# Patient Record
Sex: FEMALE | Race: OTHER | NOT HISPANIC OR LATINO | URBAN - METROPOLITAN AREA
[De-identification: names, ages, dates, MRNs, and addresses within clinical notes are randomized per-mention and may not be internally consistent; named-entity substitution may affect disease eponyms.]

---

## 2021-05-23 ENCOUNTER — INPATIENT (INPATIENT)
Facility: HOSPITAL | Age: 26
LOS: 0 days | Discharge: PSYCHIATRIC OR SUBSTANCE USE FACILITY | End: 2021-05-24
Attending: INTERNAL MEDICINE | Admitting: INTERNAL MEDICINE
Payer: MEDICAID

## 2021-05-23 VITALS
DIASTOLIC BLOOD PRESSURE: 80 MMHG | OXYGEN SATURATION: 100 % | HEART RATE: 96 BPM | HEIGHT: 63 IN | WEIGHT: 110.01 LBS | SYSTOLIC BLOOD PRESSURE: 107 MMHG | TEMPERATURE: 98 F | RESPIRATION RATE: 20 BRPM

## 2021-05-23 DIAGNOSIS — T50.902A POISONING BY UNSPECIFIED DRUGS, MEDICAMENTS AND BIOLOGICAL SUBSTANCES, INTENTIONAL SELF-HARM, INITIAL ENCOUNTER: ICD-10-CM

## 2021-05-23 DIAGNOSIS — T39.1X2A POISONING BY 4-AMINOPHENOL DERIVATIVES, INTENTIONAL SELF-HARM, INITIAL ENCOUNTER: ICD-10-CM

## 2021-05-23 LAB
ALBUMIN SERPL ELPH-MCNC: 3.8 G/DL — SIGNIFICANT CHANGE UP (ref 3.3–5)
ALBUMIN SERPL ELPH-MCNC: 3.8 G/DL — SIGNIFICANT CHANGE UP (ref 3.3–5)
ALBUMIN SERPL ELPH-MCNC: 4 G/DL — SIGNIFICANT CHANGE UP (ref 3.3–5)
ALP SERPL-CCNC: 59 U/L — SIGNIFICANT CHANGE UP (ref 40–120)
ALP SERPL-CCNC: 59 U/L — SIGNIFICANT CHANGE UP (ref 40–120)
ALP SERPL-CCNC: 65 U/L — SIGNIFICANT CHANGE UP (ref 40–120)
ALT FLD-CCNC: 14 U/L — SIGNIFICANT CHANGE UP (ref 12–78)
ALT FLD-CCNC: 21 U/L — SIGNIFICANT CHANGE UP (ref 12–78)
ALT FLD-CCNC: 25 U/L — SIGNIFICANT CHANGE UP (ref 12–78)
AMPHET UR-MCNC: NEGATIVE — SIGNIFICANT CHANGE UP
ANION GAP SERPL CALC-SCNC: 11 MMOL/L — SIGNIFICANT CHANGE UP (ref 5–17)
APAP SERPL-MCNC: 131 UG/ML — HIGH (ref 10–30)
APAP SERPL-MCNC: 19 UG/ML — SIGNIFICANT CHANGE UP (ref 10–30)
APAP SERPL-MCNC: 242 UG/ML — CRITICAL HIGH (ref 10–30)
APPEARANCE UR: CLEAR — SIGNIFICANT CHANGE UP
APTT BLD: 21.6 SEC — LOW (ref 27.5–35.5)
AST SERPL-CCNC: 16 U/L — SIGNIFICANT CHANGE UP (ref 15–37)
AST SERPL-CCNC: 17 U/L — SIGNIFICANT CHANGE UP (ref 15–37)
AST SERPL-CCNC: 17 U/L — SIGNIFICANT CHANGE UP (ref 15–37)
BACTERIA # UR AUTO: ABNORMAL
BARBITURATES UR SCN-MCNC: NEGATIVE — SIGNIFICANT CHANGE UP
BASE EXCESS BLDA CALC-SCNC: 1 MMOL/L — SIGNIFICANT CHANGE UP (ref -2–2)
BASOPHILS # BLD AUTO: 0.08 K/UL — SIGNIFICANT CHANGE UP (ref 0–0.2)
BASOPHILS NFR BLD AUTO: 0.8 % — SIGNIFICANT CHANGE UP (ref 0–2)
BENZODIAZ UR-MCNC: NEGATIVE — SIGNIFICANT CHANGE UP
BILIRUB DIRECT SERPL-MCNC: 0.13 MG/DL — SIGNIFICANT CHANGE UP (ref 0.05–0.2)
BILIRUB DIRECT SERPL-MCNC: 0.13 MG/DL — SIGNIFICANT CHANGE UP (ref 0.05–0.2)
BILIRUB INDIRECT FLD-MCNC: 0.4 MG/DL — SIGNIFICANT CHANGE UP (ref 0.2–1)
BILIRUB INDIRECT FLD-MCNC: 0.5 MG/DL — SIGNIFICANT CHANGE UP (ref 0.2–1)
BILIRUB SERPL-MCNC: 0.5 MG/DL — SIGNIFICANT CHANGE UP (ref 0.2–1.2)
BILIRUB SERPL-MCNC: 0.5 MG/DL — SIGNIFICANT CHANGE UP (ref 0.2–1.2)
BILIRUB SERPL-MCNC: 0.6 MG/DL — SIGNIFICANT CHANGE UP (ref 0.2–1.2)
BILIRUB UR-MCNC: NEGATIVE — SIGNIFICANT CHANGE UP
BLOOD GAS COMMENTS: SIGNIFICANT CHANGE UP
BLOOD GAS COMMENTS: SIGNIFICANT CHANGE UP
BLOOD GAS SOURCE: SIGNIFICANT CHANGE UP
BUN SERPL-MCNC: 9 MG/DL — SIGNIFICANT CHANGE UP (ref 7–23)
CALCIUM SERPL-MCNC: 8.4 MG/DL — LOW (ref 8.5–10.1)
CHLORIDE SERPL-SCNC: 104 MMOL/L — SIGNIFICANT CHANGE UP (ref 96–108)
CO2 SERPL-SCNC: 23 MMOL/L — SIGNIFICANT CHANGE UP (ref 22–31)
COCAINE METAB.OTHER UR-MCNC: NEGATIVE — SIGNIFICANT CHANGE UP
COLOR SPEC: YELLOW — SIGNIFICANT CHANGE UP
COVID-19 SPIKE DOMAIN AB INTERP: POSITIVE
COVID-19 SPIKE DOMAIN ANTIBODY RESULT: >250 U/ML — HIGH
CREAT SERPL-MCNC: 0.64 MG/DL — SIGNIFICANT CHANGE UP (ref 0.5–1.3)
DIFF PNL FLD: NEGATIVE — SIGNIFICANT CHANGE UP
EOSINOPHIL # BLD AUTO: 0.14 K/UL — SIGNIFICANT CHANGE UP (ref 0–0.5)
EOSINOPHIL NFR BLD AUTO: 1.3 % — SIGNIFICANT CHANGE UP (ref 0–6)
EPI CELLS # UR: SIGNIFICANT CHANGE UP
ETHANOL SERPL-MCNC: <10 MG/DL — SIGNIFICANT CHANGE UP (ref 0–10)
GLUCOSE SERPL-MCNC: 110 MG/DL — HIGH (ref 70–99)
GLUCOSE UR QL: NEGATIVE MG/DL — SIGNIFICANT CHANGE UP
HCG SERPL-ACNC: <1 MIU/ML — SIGNIFICANT CHANGE UP
HCO3 BLDA-SCNC: 22 MMOL/L — SIGNIFICANT CHANGE UP (ref 21–29)
HCT VFR BLD CALC: 37 % — SIGNIFICANT CHANGE UP (ref 34.5–45)
HGB BLD-MCNC: 12.3 G/DL — SIGNIFICANT CHANGE UP (ref 11.5–15.5)
HOROWITZ INDEX BLDA+IHG-RTO: 21 — SIGNIFICANT CHANGE UP
IMM GRANULOCYTES NFR BLD AUTO: 0.2 % — SIGNIFICANT CHANGE UP (ref 0–1.5)
INR BLD: 1.1 RATIO — SIGNIFICANT CHANGE UP (ref 0.88–1.16)
KETONES UR-MCNC: NEGATIVE — SIGNIFICANT CHANGE UP
LACTATE SERPL-SCNC: 1.6 MMOL/L — SIGNIFICANT CHANGE UP (ref 0.7–2)
LEUKOCYTE ESTERASE UR-ACNC: ABNORMAL
LIDOCAIN IGE QN: 86 U/L — SIGNIFICANT CHANGE UP (ref 73–393)
LYMPHOCYTES # BLD AUTO: 4.39 K/UL — HIGH (ref 1–3.3)
LYMPHOCYTES # BLD AUTO: 41.3 % — SIGNIFICANT CHANGE UP (ref 13–44)
MCHC RBC-ENTMCNC: 25.4 PG — LOW (ref 27–34)
MCHC RBC-ENTMCNC: 33.2 GM/DL — SIGNIFICANT CHANGE UP (ref 32–36)
MCV RBC AUTO: 76.3 FL — LOW (ref 80–100)
METHADONE UR-MCNC: NEGATIVE — SIGNIFICANT CHANGE UP
MONOCYTES # BLD AUTO: 0.89 K/UL — SIGNIFICANT CHANGE UP (ref 0–0.9)
MONOCYTES NFR BLD AUTO: 8.4 % — SIGNIFICANT CHANGE UP (ref 2–14)
NEUTROPHILS # BLD AUTO: 5.12 K/UL — SIGNIFICANT CHANGE UP (ref 1.8–7.4)
NEUTROPHILS NFR BLD AUTO: 48 % — SIGNIFICANT CHANGE UP (ref 43–77)
NITRITE UR-MCNC: POSITIVE
NRBC # BLD: 0 /100 WBCS — SIGNIFICANT CHANGE UP (ref 0–0)
OPIATES UR-MCNC: NEGATIVE — SIGNIFICANT CHANGE UP
PCO2 BLDA: 28 MMHG — LOW (ref 32–46)
PCP SPEC-MCNC: SIGNIFICANT CHANGE UP
PCP UR-MCNC: NEGATIVE — SIGNIFICANT CHANGE UP
PH BLD: 7.51 — HIGH (ref 7.35–7.45)
PH UR: 7 — SIGNIFICANT CHANGE UP (ref 5–8)
PLATELET # BLD AUTO: 297 K/UL — SIGNIFICANT CHANGE UP (ref 150–400)
PO2 BLDA: 130 MMHG — HIGH (ref 74–108)
POTASSIUM SERPL-MCNC: 3 MMOL/L — LOW (ref 3.5–5.3)
POTASSIUM SERPL-SCNC: 3 MMOL/L — LOW (ref 3.5–5.3)
PROT SERPL-MCNC: 7.2 GM/DL — SIGNIFICANT CHANGE UP (ref 6–8.3)
PROT SERPL-MCNC: 7.3 GM/DL — SIGNIFICANT CHANGE UP (ref 6–8.3)
PROT SERPL-MCNC: 7.3 GM/DL — SIGNIFICANT CHANGE UP (ref 6–8.3)
PROT UR-MCNC: NEGATIVE MG/DL — SIGNIFICANT CHANGE UP
PROTHROM AB SERPL-ACNC: 12.7 SEC — SIGNIFICANT CHANGE UP (ref 10.6–13.6)
RAPID RVP RESULT: SIGNIFICANT CHANGE UP
RBC # BLD: 4.85 M/UL — SIGNIFICANT CHANGE UP (ref 3.8–5.2)
RBC # FLD: 13.8 % — SIGNIFICANT CHANGE UP (ref 10.3–14.5)
RBC CASTS # UR COMP ASSIST: SIGNIFICANT CHANGE UP /HPF (ref 0–4)
SALICYLATES SERPL-MCNC: <1.7 MG/DL — LOW (ref 2.8–20)
SAO2 % BLDA: 99 % — HIGH (ref 92–96)
SARS-COV-2 IGG+IGM SERPL QL IA: >250 U/ML — HIGH
SARS-COV-2 IGG+IGM SERPL QL IA: POSITIVE
SARS-COV-2 RNA SPEC QL NAA+PROBE: SIGNIFICANT CHANGE UP
SODIUM SERPL-SCNC: 138 MMOL/L — SIGNIFICANT CHANGE UP (ref 135–145)
SP GR SPEC: 1 — LOW (ref 1.01–1.02)
THC UR QL: NEGATIVE — SIGNIFICANT CHANGE UP
TSH SERPL-MCNC: 1.32 UIU/ML — SIGNIFICANT CHANGE UP (ref 0.36–3.74)
UROBILINOGEN FLD QL: NEGATIVE MG/DL — SIGNIFICANT CHANGE UP
WBC # BLD: 10.64 K/UL — HIGH (ref 3.8–10.5)
WBC # FLD AUTO: 10.64 K/UL — HIGH (ref 3.8–10.5)
WBC UR QL: SIGNIFICANT CHANGE UP

## 2021-05-23 PROCEDURE — 99222 1ST HOSP IP/OBS MODERATE 55: CPT

## 2021-05-23 PROCEDURE — 99053 MED SERV 10PM-8AM 24 HR FAC: CPT

## 2021-05-23 PROCEDURE — 93010 ELECTROCARDIOGRAM REPORT: CPT

## 2021-05-23 PROCEDURE — 12345: CPT | Mod: NC

## 2021-05-23 PROCEDURE — 99291 CRITICAL CARE FIRST HOUR: CPT

## 2021-05-23 RX ORDER — ACETYLCYSTEINE 200 MG/ML
7 VIAL (ML) MISCELLANEOUS ONCE
Refills: 0 | Status: COMPLETED | OUTPATIENT
Start: 2021-05-23 | End: 2021-05-23

## 2021-05-23 RX ORDER — ACETYLCYSTEINE 200 MG/ML
2.5 VIAL (ML) MISCELLANEOUS ONCE
Refills: 0 | Status: COMPLETED | OUTPATIENT
Start: 2021-05-23 | End: 2021-05-23

## 2021-05-23 RX ORDER — POTASSIUM CHLORIDE 20 MEQ
40 PACKET (EA) ORAL ONCE
Refills: 0 | Status: COMPLETED | OUTPATIENT
Start: 2021-05-23 | End: 2021-05-23

## 2021-05-23 RX ORDER — ACETYLCYSTEINE 200 MG/ML
5 VIAL (ML) MISCELLANEOUS ONCE
Refills: 0 | Status: COMPLETED | OUTPATIENT
Start: 2021-05-23 | End: 2021-05-23

## 2021-05-23 RX ORDER — SODIUM CHLORIDE 9 MG/ML
1000 INJECTION INTRAMUSCULAR; INTRAVENOUS; SUBCUTANEOUS ONCE
Refills: 0 | Status: COMPLETED | OUTPATIENT
Start: 2021-05-23 | End: 2021-05-23

## 2021-05-23 RX ORDER — ACTIVATED CHARCOAL 208 MG/ML
50 SUSPENSION ORAL ONCE
Refills: 0 | Status: COMPLETED | OUTPATIENT
Start: 2021-05-23 | End: 2021-05-23

## 2021-05-23 RX ADMIN — Medication 64.06 GRAM(S): at 11:38

## 2021-05-23 RX ADMIN — Medication 128.13 GRAM(S): at 07:03

## 2021-05-23 RX ADMIN — SODIUM CHLORIDE 1000 MILLILITER(S): 9 INJECTION INTRAMUSCULAR; INTRAVENOUS; SUBCUTANEOUS at 06:36

## 2021-05-23 RX ADMIN — Medication 285 GRAM(S): at 05:44

## 2021-05-23 RX ADMIN — ACTIVATED CHARCOAL 50 GRAM(S): 208 SUSPENSION ORAL at 05:56

## 2021-05-23 RX ADMIN — Medication 40 MILLIEQUIVALENT(S): at 06:05

## 2021-05-23 NOTE — ED PROVIDER NOTE - PROGRESS NOTE DETAILS
[facility] poison control consulted for Tylenol overdose, Pabu:  agree with NAC asap, admission, recommend charcoal 50 g oral as well, given normal mental status, repeat tylenol level recommended at 4 hour corinne

## 2021-05-23 NOTE — ED PROVIDER NOTE - CRITICAL CARE ATTENDING CONTRIBUTION TO CARE
Pt. was critically ill and suffered potentially life threatening medical problems.  I personally consulted other physicians, spoke with patient's family regarding patient's condition, performed emergent diagnostic procedures and lab work, performed procedures at bedside.

## 2021-05-23 NOTE — PROGRESS NOTE ADULT - PROBLEM SELECTOR PLAN 1
Admitted to [facility]  [facility] -- current back is 5 grams over 16 hours, started at 5 AM on 5/23  Initial LFTs normal; initial Tylenol level 242  F/U at 11:30 5/23 shows LFTs normal; Tylenol level 131  Per d/w poison control:    Recheck LFTs and Tylenol at 6 PM; if LFTs normal and Tylenol = 0, then complete the current bag only.    If LFTs abnormal or Tylenol > 0, then recheck at 10 PM    If still LFTs abnormal or Tylenol > 0, then:        hang another 5 gm over 16 hours when current bag done.         recheck LFTs and Tylenol at 3 AM and tomorrow at 8 AM    Repeat until Tylenol = 0 and LFTs normal (or high but trending down) Admitted to [facility]  [facility] -- current back is 5 grams over 16 hours, started at 5 AM on 5/23  Initial LFTs normal; initial Tylenol level 242  F/U at 11:30 5/23 shows LFTs normal; Tylenol level 131  Per d/w Kendy at poison control:    Recheck LFTs and Tylenol at 6 PM; if LFTs normal and Tylenol = 0, then complete the current bag only.    If LFTs abnormal or Tylenol > 0, then recheck at 10 PM    If still LFTs abnormal or Tylenol > 0, then:        hang another 5 gm over 16 hours when current bag done.         recheck LFTs and Tylenol at 3 AM and tomorrow at 8 AM    Repeat until Tylenol = 0 and LFTs normal (or high but trending down)  Will report all findings to Poison Control at [phone] Admitted to [facility]  c.[facility] -- current bag is 5 grams over 16 hours, started at 11 AM on 5/23  Initial ingestion approx. 3:20 AM on 5/23  Initial LFTs normal; initial Tylenol level 242  F/U at 11:30 5/23 shows LFTs normal; Tylenol level 131  F/U at 18:00 5/23 shows LFTs normal; Tylenol level 19  Per d/w Kendy at poison control:    Recheck LFTs and Tylenol at 10 PM, 3 AM, 8 AM    Continue current NAC bag (ends at 3 AM) regardless    At 3 AM, If LFTs abnormal or Tylenol > 0, then:        hang another 5 gm over 16 hours        recheck LFTs and Tylenol ~ every 4-5 hours;     Repeat until Tylenol = 0 and LFTs normal (or high but trending down)  Will report all findings to Poison Control at [phone]

## 2021-05-23 NOTE — H&P ADULT - NSHPPHYSICALEXAM_GEN_ALL_CORE
Physical exam:   General: patient in not toxic   Head:  Atraumatic, Normocephalic  Eyes: EOMI, PERRLA, clear sclera  Neck: Supple, thyroid nontender, non enlarged  Cardio: S1/S2 +ve, regular rate and rhythm, no M/G/R  Resp: clear to ausculation bilaterally, no rales or wheezes  GI: abdomen soft, nontender, non distended, no guarding, BS +ve x 4  Ext: no significant pedal edema  Neuro: move all limbs

## 2021-05-23 NOTE — ED ADULT NURSE NOTE - ED STAT RN HANDOFF DETAILS
Report given to Melissa PETERS. Patient is A&Ox4. Patient denies pain and discomfort. Patient is in no acute distress. Patient has acetylcysteine running at 250mL/hr in thr right AC 20g. Patient has IV access in the left AC 20g as well. Patient received all medications as ordered and no adverse reactions were noted. Patient's vitals are within normal limits, all concerns endorsed to the oncoming RN.

## 2021-05-23 NOTE — H&P ADULT - ASSESSMENT
24 yo female with PMH of depression admitted for tylenol overdose and suicidal attempts    tylenol level 242. LFT wnl.  [facility] poison control consulted for Tylenol overdose, Pabu:  agree with NAC asap, admission, recommend charcoal 50 g oral as well, given normal mental status, repeat tylenol level recommended at 4 hour corinne at 7 am

## 2021-05-23 NOTE — BH CONSULTATION LIAISON ASSESSMENT NOTE - SUMMARY
26 yo F with depression never in treatment, no prior SA, bibems s/p suicide attempt.  Pt. took estimated 30 tabs of 500 mg tylenol extra strength tabs about 30 minutes before ER presentation. Patient is status post suicide attempt feeling hopeless and worthless, will likely need psych admission once medically cleared, cannot leave AMA

## 2021-05-23 NOTE — BH CONSULTATION LIAISON ASSESSMENT NOTE - NSBHCHARTREVIEWVS_PSY_A_CORE FT
Vital Signs Last 24 Hrs  T(C): 36.4 (23 May 2021 12:17), Max: 36.5 (23 May 2021 03:51)  T(F): 97.5 (23 May 2021 12:17), Max: 97.7 (23 May 2021 03:51)  HR: 87 (23 May 2021 12:17) (82 - 107)  BP: 97/99 (23 May 2021 12:17) (92/72 - 115/65)  BP(mean): 76 (23 May 2021 06:12) (76 - 76)  RR: 16 (23 May 2021 12:17) (16 - 21)  SpO2: 99% (23 May 2021 12:17) (97% - 100%)

## 2021-05-23 NOTE — ED PROVIDER NOTE - PHYSICAL EXAMINATION
Vitals: WNL  Gen: AAOx3, NAD, sitting comfortably in stretcher, non-toxic   Head: ncat, perrla, eomi b/l  Neck: supple, no lymphadenopathy, no midline deviation  Heart: rrr, no m/r/g  Lungs: CTA b/l, no rales/ronchi/wheezes  Abd: soft, nontender, non-distended, no rebound or guarding  Ext: no clubbing/cyanosis/edema  Neuro: sensation and muscle strength intact b/l, steady gait

## 2021-05-23 NOTE — H&P ADULT - HISTORY OF PRESENT ILLNESS
history was obtained from ED physician and documentation since pt was crying in tears during my interview only shake/node head.     " 24 yo F with depression, bibems s/p suicide attempt.  Pt. took estimated 30 tabs of 500 mg tylenol extra strength tabs about 30 minutes before ER presentation.  Pt. denies acute inciting event, but notes depression for months.  She denies being formally evaluated.  She decided to end her life tonight because it's too much.  Pt. doesn't elaborate further, but stays it's ok to call her , Xavi: [phone].  Pt. admits that she vomited a few times and saw white pills in vomitus. "

## 2021-05-23 NOTE — H&P ADULT - NSHPLABSRESULTS_GEN_ALL_CORE
Vital Signs Last 24 Hrs  T(C): 36.3 (23 May 2021 06:12), Max: 36.5 (23 May 2021 03:51)  T(F): 97.3 (23 May 2021 06:12), Max: 97.7 (23 May 2021 03:51)  HR: 107 (23 May 2021 06:12) (96 - 107)  BP: 115/65 (23 May 2021 06:12) (107/80 - 115/65)  BP(mean): 76 (23 May 2021 06:12) (76 - 76)  RR: 20 (23 May 2021 06:12) (20 - 20)  SpO2: 100% (23 May 2021 06:12) (100% - 100%)    CBC Full  -  ( 23 May 2021 04:28 )  WBC Count : 10.64 K/uL  RBC Count : 4.85 M/uL  Hemoglobin : 12.3 g/dL  Hematocrit : 37.0 %  Platelet Count - Automated : 297 K/uL  Mean Cell Volume : 76.3 fl  Mean Cell Hemoglobin : 25.4 pg  Mean Cell Hemoglobin Concentration : 33.2 gm/dL  Auto Neutrophil # : 5.12 K/uL  Auto Lymphocyte # : 4.39 K/uL  Auto Monocyte # : 0.89 K/uL  Auto Eosinophil # : 0.14 K/uL  Auto Basophil # : 0.08 K/uL  Auto Neutrophil % : 48.0 %  Auto Lymphocyte % : 41.3 %  Auto Monocyte % : 8.4 %  Auto Eosinophil % : 1.3 %  Auto Basophil % : 0.8 %    05-23    138  |  104  |  9   ----------------------------<  110<H>  3.0<L>   |  23  |  0.64    Ca    8.4<L>      23 May 2021 04:28    TPro  7.2  /  Alb  4.0  /  TBili  0.5  /  DBili  x   /  AST  17  /  ALT  14  /  AlkPhos  65  05-23    SARS-CoV-2: NotDetec (23 May 2021 04:28)    Home Medications:    LIVER FUNCTIONS - ( 23 May 2021 04:28 )  Alb: 4.0 g/dL / Pro: 7.2 gm/dL / ALK PHOS: 65 U/L / ALT: 14 U/L / AST: 17 U/L / GGT: x           PT/INR - ( 23 May 2021 04:28 )   PT: 12.7 sec;   INR: 1.10 ratio         PTT - ( 23 May 2021 04:28 )  PTT:21.6 sec with patient

## 2021-05-23 NOTE — ED PROVIDER NOTE - OBJECTIVE STATEMENT
24 yo F with depression, bibems s/p suicide attempt.  Pt. took estimated 30 tabs of 500 mg tylenol extra strength tabs about 30 minutes before ER presentation.  Pt. denies acute inciting event, but notes depression for months.  She denies being formally evaluated.  She decided to end her life tonight because it's too much.  Pt. doesn't elaborate further, but stays it's ok to call her , Xavi: [phone].  Pt. admits that she vomited a few times and saw white pills in vomitus.    ROS: negative for fever, cough, headache, chest pain, shortness of breath, abd pain, nausea, vomiting, diarrhea, rash, paresthesia, and weakness--all other systems reviewed are negative.   PMH: Denies; Meds: Denies; SH: Denies smoking/drinking/drug use

## 2021-05-23 NOTE — ED BEHAVIORAL HEALTH NOTE - BEHAVIORAL HEALTH NOTE
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 Pt takes care of ADLs independently.   DATE HPI STARTED: About 1 week   DECOMPENSATION: Pt and  were  in [facility], and pt and  [place] to [place] last year. ’s parents live in the [place], and do not accept pt as ’s Wife.  is allowed into their home, but pt is not allowed into their home.  and pt talked about possibility of , and pt has become increasingly anxious and upset about this. This week pt has made suicidal statements such as “If I don’t get you, I will not be living.” Pt was informed yesterday by a mutual friend that  made a final decision that pt and  will separate. Pt sent text to  stating “no-one is responsible for my death or demise.”  rushed home, and pt opened the door and stated that she took half a bottle of medicine.  denies psychotic symptoms, aggression/violence, substance use, changes to ADLs or access to guns/weapons.        SUICIDALITY: Over the past few days pt has made statements such as “If I don’t get you I will not be living.”   VIOLENCE: None  SUBSTANCE: None      ========================  PAST PSYCHIATRIC HISTORY  ========================  DATE PAST PSYCHIATRIC HISTORY STARTED: no past psychiatric hx  MAIN PSYCHIATRIC DIAGNOSIS: no past psychiatric hx   PSYCHIATRIC HOSPITALIZATIONS: None  PRIOR ILLNESS: None   SUICIDALITY: No hx of self injury or SA  VIOLENCE: No violence towards others. Pt has thrown phone down when frustrated.    SUBSTANCE USE: None      ==============  OTHER HISTORY  ==============  CURRENT MEDICATION: None  MEDICAL HISTORY: About 2 months ago pt had COVID and a type of fungus or rash on her face, which seems to have resolved.   ALLERGIES: None   LEGAL ISSUES: None  FIREARM ACCESS: None  SOCIAL HISTORY: Pt lived in [facility] until immig[place] the [place] last [place]. Some family members including Sister and ’s parents live in the [place].    FAMILY HISTORY: None  DEVELOPMENTAL HISTORY: No abnormalities reported

## 2021-05-23 NOTE — ED PROVIDER NOTE - CLINICAL SUMMARY MEDICAL DECISION MAKING FREE TEXT BOX
26 yo F with suicide attempt, tylenol overdose  -stat labs, and repeat tylenol at 7:30 am (4 hr level), basic labs, coags, tylenol, etoh, drug screen, hcg, abg, rvp/covid, lactate, tsh, ua, cx, ekg, iv, hydration, monitor, 1:1 OBS  -f/u results, reeval

## 2021-05-23 NOTE — BH CONSULTATION LIAISON ASSESSMENT NOTE - NSBHCONSULTRECOMMENDOTHER_PSY_A_CORE FT
-c/w CO 1:1  -can give ativan 1mg q4H PRN anxiety  -for severe agitation, Provide Haldol 5mg q 6 hours PRN for agitation (IM or p.o.), Ativan 2mg q 6 hours PRN for agitation (IM or p.o.), Benadryl 50mg q 6 hours PRN for agitation (IM or p.o.)   -cannot leave AMA  -psychiatry will follow, likely need inpatient admission once medically cleared

## 2021-05-23 NOTE — ED ADULT NURSE NOTE - OBJECTIVE STATEMENT
Pt alert and verbally responsive present to ED with suicidal attempt, took 30 tabs of tylenols 500mg 30 ellie ago, complaining of abdominal pain and nasal congestion. Pt sated feeling depressed for a long time, also pt  called ambulance. Pt denies any trauma tonight or give any specific reason why she took the pills except for has been felling depressed, pt unable to answer any further question during assessment, pt became withdrawn, 1:1 monitoring initiated immediately.

## 2021-05-23 NOTE — ED ADULT NURSE NOTE - CHIEF COMPLAINT QUOTE
suicidal attempt, took 30 tabs of tylenol 500mg 30 mins ago, complaining of abdominal pain and nasal congestion

## 2021-05-23 NOTE — ED PROVIDER NOTE - CARE PLAN
Principal Discharge DX:	Tylenol overdose, intentional self-harm, initial encounter   Principal Discharge DX:	Tylenol overdose, intentional self-harm, initial encounter  Secondary Diagnosis:	Acute cystitis without hematuria

## 2021-05-23 NOTE — ED ADULT NURSE NOTE - NSIMPLEMENTINTERV_GEN_ALL_ED
Implemented All Fall Risk Interventions:  [place] to call system. Call bell, personal items and telephone within reach. Instruct patient to call for assistance. Room bathroom lighting operational. Non-slip footwear when patient is off stretcher. Physically safe environment: no spills, clutter or unnecessary equipment. Stretcher in lowest position, wheels locked, appropriate side rails in place. Provide visual cue, wrist band, yellow gown, etc. Monitor gait and stability. Monitor for mental status changes and reorient to person, place, and time. Review medications for side effects contributing to fall risk. Reinforce activity limits and safety measures with patient and family.

## 2021-05-24 ENCOUNTER — INPATIENT (INPATIENT)
Facility: HOSPITAL | Age: 26
LOS: 10 days | Discharge: HOME | End: 2021-06-04
Attending: PSYCHIATRY & NEUROLOGY | Admitting: PSYCHIATRY & NEUROLOGY
Payer: COMMERCIAL

## 2021-05-24 VITALS — WEIGHT: 111.99 LBS | HEIGHT: 61 IN

## 2021-05-24 VITALS
TEMPERATURE: 98 F | DIASTOLIC BLOOD PRESSURE: 73 MMHG | RESPIRATION RATE: 18 BRPM | OXYGEN SATURATION: 98 % | HEART RATE: 74 BPM | SYSTOLIC BLOOD PRESSURE: 106 MMHG

## 2021-05-24 DIAGNOSIS — F32.9 MAJOR DEPRESSIVE DISORDER, SINGLE EPISODE, UNSPECIFIED: ICD-10-CM

## 2021-05-24 LAB
ALBUMIN SERPL ELPH-MCNC: 3.6 G/DL — SIGNIFICANT CHANGE UP (ref 3.3–5)
ALBUMIN SERPL ELPH-MCNC: 3.8 G/DL — SIGNIFICANT CHANGE UP (ref 3.3–5)
ALP SERPL-CCNC: 53 U/L — SIGNIFICANT CHANGE UP (ref 40–120)
ALP SERPL-CCNC: 55 U/L — SIGNIFICANT CHANGE UP (ref 40–120)
ALP SERPL-CCNC: 58 U/L — SIGNIFICANT CHANGE UP (ref 40–120)
ALP SERPL-CCNC: 59 U/L — SIGNIFICANT CHANGE UP (ref 40–120)
ALT FLD-CCNC: 18 U/L — SIGNIFICANT CHANGE UP (ref 12–78)
ALT FLD-CCNC: 20 U/L — SIGNIFICANT CHANGE UP (ref 12–78)
ALT FLD-CCNC: 21 U/L — SIGNIFICANT CHANGE UP (ref 12–78)
ALT FLD-CCNC: 21 U/L — SIGNIFICANT CHANGE UP (ref 12–78)
ANION GAP SERPL CALC-SCNC: 8 MMOL/L — SIGNIFICANT CHANGE UP (ref 5–17)
APAP SERPL-MCNC: < 2 UG/ML (ref 10–30)
APAP SERPL-MCNC: <2 UG/ML — LOW (ref 10–30)
APAP SERPL-MCNC: <2 UG/ML — LOW (ref 10–30)
AST SERPL-CCNC: 11 U/L — LOW (ref 15–37)
AST SERPL-CCNC: 12 U/L — LOW (ref 15–37)
AST SERPL-CCNC: 14 U/L — LOW (ref 15–37)
AST SERPL-CCNC: 17 U/L — SIGNIFICANT CHANGE UP (ref 15–37)
BILIRUB DIRECT SERPL-MCNC: 0.15 MG/DL — SIGNIFICANT CHANGE UP (ref 0.05–0.2)
BILIRUB DIRECT SERPL-MCNC: 0.16 MG/DL — SIGNIFICANT CHANGE UP (ref 0.05–0.2)
BILIRUB DIRECT SERPL-MCNC: 0.16 MG/DL — SIGNIFICANT CHANGE UP (ref 0.05–0.2)
BILIRUB INDIRECT FLD-MCNC: 0.4 MG/DL — SIGNIFICANT CHANGE UP (ref 0.2–1)
BILIRUB INDIRECT FLD-MCNC: 0.4 MG/DL — SIGNIFICANT CHANGE UP (ref 0.2–1)
BILIRUB INDIRECT FLD-MCNC: 0.5 MG/DL — SIGNIFICANT CHANGE UP (ref 0.2–1)
BILIRUB SERPL-MCNC: 0.6 MG/DL — SIGNIFICANT CHANGE UP (ref 0.2–1.2)
BILIRUB SERPL-MCNC: 0.7 MG/DL — SIGNIFICANT CHANGE UP (ref 0.2–1.2)
BUN SERPL-MCNC: 4 MG/DL — LOW (ref 7–23)
CALCIUM SERPL-MCNC: 8.5 MG/DL — SIGNIFICANT CHANGE UP (ref 8.5–10.1)
CHLORIDE SERPL-SCNC: 105 MMOL/L — SIGNIFICANT CHANGE UP (ref 96–108)
CO2 SERPL-SCNC: 26 MMOL/L — SIGNIFICANT CHANGE UP (ref 22–31)
CREAT SERPL-MCNC: 0.57 MG/DL — SIGNIFICANT CHANGE UP (ref 0.5–1.3)
GLUCOSE SERPL-MCNC: 98 MG/DL — SIGNIFICANT CHANGE UP (ref 70–99)
HCT VFR BLD CALC: 36.8 % — SIGNIFICANT CHANGE UP (ref 34.5–45)
HGB BLD-MCNC: 12.5 G/DL — SIGNIFICANT CHANGE UP (ref 11.5–15.5)
MAGNESIUM SERPL-MCNC: 2.3 MG/DL — SIGNIFICANT CHANGE UP (ref 1.6–2.6)
MCHC RBC-ENTMCNC: 25.2 PG — LOW (ref 27–34)
MCHC RBC-ENTMCNC: 34 GM/DL — SIGNIFICANT CHANGE UP (ref 32–36)
MCV RBC AUTO: 74.2 FL — LOW (ref 80–100)
NRBC # BLD: 0 /100 WBCS — SIGNIFICANT CHANGE UP (ref 0–0)
PLATELET # BLD AUTO: 283 K/UL — SIGNIFICANT CHANGE UP (ref 150–400)
POTASSIUM SERPL-MCNC: 3.3 MMOL/L — LOW (ref 3.5–5.3)
POTASSIUM SERPL-SCNC: 3.3 MMOL/L — LOW (ref 3.5–5.3)
PROT SERPL-MCNC: 6.8 GM/DL — SIGNIFICANT CHANGE UP (ref 6–8.3)
PROT SERPL-MCNC: 6.9 GM/DL — SIGNIFICANT CHANGE UP (ref 6–8.3)
PROT SERPL-MCNC: 6.9 GM/DL — SIGNIFICANT CHANGE UP (ref 6–8.3)
PROT SERPL-MCNC: 7.1 GM/DL — SIGNIFICANT CHANGE UP (ref 6–8.3)
RBC # BLD: 4.96 M/UL — SIGNIFICANT CHANGE UP (ref 3.8–5.2)
RBC # FLD: 13.8 % — SIGNIFICANT CHANGE UP (ref 10.3–14.5)
SODIUM SERPL-SCNC: 139 MMOL/L — SIGNIFICANT CHANGE UP (ref 135–145)
WBC # BLD: 8.67 K/UL — SIGNIFICANT CHANGE UP (ref 3.8–10.5)
WBC # FLD AUTO: 8.67 K/UL — SIGNIFICANT CHANGE UP (ref 3.8–10.5)

## 2021-05-24 PROCEDURE — 99238 HOSP IP/OBS DSCHRG MGMT 30/<: CPT

## 2021-05-24 PROCEDURE — 99222 1ST HOSP IP/OBS MODERATE 55: CPT

## 2021-05-24 PROCEDURE — 99232 SBSQ HOSP IP/OBS MODERATE 35: CPT

## 2021-05-24 RX ORDER — ASCORBIC ACID 60 MG
500 TABLET,CHEWABLE ORAL DAILY
Refills: 0 | Status: DISCONTINUED | OUTPATIENT
Start: 2021-05-24 | End: 2021-06-04

## 2021-05-24 RX ORDER — LANOLIN ALCOHOL/MO/W.PET/CERES
3 CREAM (GRAM) TOPICAL AT BEDTIME
Refills: 0 | Status: DISCONTINUED | OUTPATIENT
Start: 2021-05-24 | End: 2021-05-24

## 2021-05-24 RX ORDER — HYDROXYZINE HCL 10 MG
25 TABLET ORAL EVERY 6 HOURS
Refills: 0 | Status: DISCONTINUED | OUTPATIENT
Start: 2021-05-24 | End: 2021-06-04

## 2021-05-24 RX ORDER — LANOLIN ALCOHOL/MO/W.PET/CERES
5 CREAM (GRAM) TOPICAL AT BEDTIME
Refills: 0 | Status: DISCONTINUED | OUTPATIENT
Start: 2021-05-24 | End: 2021-05-26

## 2021-05-24 RX ORDER — CHOLECALCIFEROL (VITAMIN D3) 125 MCG
1000 CAPSULE ORAL DAILY
Refills: 0 | Status: DISCONTINUED | OUTPATIENT
Start: 2021-05-24 | End: 2021-06-04

## 2021-05-24 RX ORDER — POTASSIUM CHLORIDE 20 MEQ
40 PACKET (EA) ORAL ONCE
Refills: 0 | Status: COMPLETED | OUTPATIENT
Start: 2021-05-24 | End: 2021-05-24

## 2021-05-24 RX ADMIN — Medication 5 MILLIGRAM(S): at 23:16

## 2021-05-24 RX ADMIN — Medication 40 MILLIEQUIVALENT(S): at 06:30

## 2021-05-24 NOTE — BH CONSULTATION LIAISON PROGRESS NOTE - NSBHFUPINTERVALHXFT_PSY_A_CORE
Case handed-off by Telepsychiatry. Patient at this time is medically cleared as per Primary Medical Team.  Case handed-off by Telepsychiatry. Patient at this time is medically cleared as per Primary Medical Team. No significant interval events. Patient has been calm, cooperative.  Case handed-off by Telepsychiatry. Patient at this time is medically cleared as per Primary Medical Team. No significant interval events. Patient has been calm, cooperative. EXAM: Patient interviewed via audio  Services as Video was not available (Kaylah; ID # [id number]). Patient is very nice, engages easily and says that she has been struggling with being far away from her family, she used to talk to them for hours and not it is only minutes because 'I am so tired. " Patient was explained the transfer to psychiatry and the reasoning behind it - initially she said she would like to go home with her  and then asked if they could help her feel better about herself, have more self confidence and also help her work towards a goal, feel like she is working towards something. patient at this time feels like "my life is not going anywhere.' Patient at this time denies active suicidal ideation, intent and plan and says being in a different environment and talking with other people helped her feel better.

## 2021-05-24 NOTE — BH CONSULTATION LIAISON PROGRESS NOTE - CURRENT MEDICATION
MEDICATIONS  (STANDING):    MEDICATIONS  (PRN):  LORazepam     Tablet 1 milliGRAM(s) Oral every 6 hours PRN Anxiety

## 2021-05-24 NOTE — DISCHARGE NOTE PROVIDER - HOSPITAL COURSE
26 yo female with PMH of depression admitted for tylenol overdose and suicidal attempt; tylenol level 242. LFT wnl.  [facility] poison control consulted for Tylenol overdose     Problem/Plan - 1:  ·  Problem: Tylenol overdose, intentional self-harm, initial encounter.  Plan: Admitted to tele  Given N-Acetylcysteine per Poison Control protocol  Initial ingestion approx. 3:20 AM on 5/23; 30 tablets of acetaminophen, some of which were vomited back up.   Initial LFTs normal and have remained so;  initial Tylenol level 242 --> 131, 19, <2, <2, <2  No evidence of any acute or lasting medical complications.  Appreciate the support of Kendy from [facility] Poison Control at [phone].      Problem/Plan - 2:  ·  Problem: Suicidal overdose, initial encounter.  Plan: psych consulted for suicidal depression (Telepsych --> [facility])  Psychiatry recommending inpatient psychiatric treatment as pt is danger to self; I agree    EKG from 5/23 normal  COVID from 5/23 negative  LFTs remain normal  Tylenol level now undetectable    Medically stable for transfer to inpatient psych.

## 2021-05-24 NOTE — BH PATIENT PROFILE - NSBHTHTPROCESS_PSY_A_CORE
· History of reactive airway disease  · PFTs without significant restriction or obstruction  · Currently has not used Advair for > 2 months-if symptoms return she will plan to notify our office and start lower dose advair    Has not used her albuterol MDI or nebulizer in >2 months  · Suspect allergy component: continue singulair and zyrtec, flonase  · Denies: bronchospasm or chest tightness/wheezing  · Reports very minimal cough with intermittent congestion-denies significant sputum production or triggers goal directed

## 2021-05-24 NOTE — BH INPATIENT PSYCHIATRY ASSESSMENT NOTE - NSDCCRITERIA_PSY_ALL_CORE
when patient is no longer at acutely elevated risk of harm to self and has symptomatic improvement.  when patient is no longer at acutely elevated risk of harm to self and has symptomatic improvement;  cgi<=2

## 2021-05-24 NOTE — BH INPATIENT PSYCHIATRY ASSESSMENT NOTE - NSBHMETABOLIC_PSY_ALL_CORE_FT
BMI: BMI (kg/m2): 21.2 (05-24-21 @ 17:59)  HbA1c:   Glucose: POCT Blood Glucose.: 118 mg/dL (05-23-21 @ 15:24)    BP: --  Lipid Panel:

## 2021-05-24 NOTE — BH INPATIENT PSYCHIATRY ASSESSMENT NOTE - NSBHCHARTREVIEWINVESTIGATE_PSY_A_CORE FT
< from: 12 Lead ECG (05.23.21 @ 04:07) >      Ventricular Rate 88 BPM    Atrial Rate 88 BPM    P-R Interval 140 ms    QRS Duration 72 ms    Q-T Interval 348 ms    QTC Calculation(Bazett) 421 ms    P Axis 50 degrees    R Axis 80 degrees    T Axis 34 degrees    Diagnosis Line Normal sinus rhythm  Normal ECG  No previous ECGs available  Confirmed by ELEAZAR TAVERA, EDITH ([id number]) on 5/23/2021 11:36:53 AM    < end of copied text >

## 2021-05-24 NOTE — DISCHARGE NOTE NURSING/CASE MANAGEMENT/SOCIAL WORK - PATIENT PORTAL LINK FT
You can access the FollowMyHealth Patient Portal offered by [facility] by registering at the following website: [email or web address]. By joining [employer]’s FollowMyHealth portal, you will also be able to view your health information using other applications (apps) compatible with our system.

## 2021-05-24 NOTE — BH INPATIENT PSYCHIATRY ASSESSMENT NOTE - NSBHASSESSSUMMFT_PSY_ALL_CORE
26 yo F without formal past psych history, h/o remote NSSIB via superficial cutting, admitted s/p suicide attempt via Tylenol overdose in s/o psychosocial stressors and hopelessness.     Patient with depressive sx in s/o psychosocial stressors meeting criteria for severe MDD. Will defer med management to primary team, r/o Bipolar 2, PTSD, cluster B pathology. On unit, denying SI, feeling hopeful and is help-seeking.       Plan:  - admit to [address] [place] [address] on 2 PC  - Q15 checks (no 1:1 CO required)  - Atarax 50 mg q6h PRN for anxiety, Ativan 1 mg PO PRN for severe anxiety/agitation, Ativan 2 mg IM for severe agitation.   - Standing psych meds - defer to primary team  - Medical: patient reports taking vit C and vit D at home since getting COVID. Also reports several food allergies that give her itchy eyes and rash including sweet potato, lentil, shellfish and beef. Primary team to follow up/further clarify.  26 yo [facility] [place] without formal past psych history, h/o remote NSSIB via superficial cutting, admitted s/p suicide attempt via Tylenol overdose in s/o psychosocial stressors and hopelessness, transferred by CL to [facility] 1S. Patient with depressive sx in s/o psychosocial stressors meeting criteria for severe MDD. Will defer med management to primary team, r/o Bipolar 2, PTSD, cluster B pathology. On unit, denying SI, feeling hopeful and is help-seeking.     Plan:  - admit to [address] [place] [address] on 2 PC  - Q15 checks (no 1:1 CO required)  - Atarax 50 mg q6h PRN for anxiety, Ativan 1 mg PO PRN for severe anxiety/agitation, Ativan 2 mg IM for severe agitation.   - Standing psych meds - defer to primary team notably in context of s/p tylenol OD  - Medical: patient reports taking vit C and vit D at home since getting COVID. Also reports several food allergies that give her itchy eyes and rash including sweet potato, lentil, shellfish and beef. Primary team to follow up/further clarify, also insure menu choices given language barrier.

## 2021-05-24 NOTE — BH INPATIENT PSYCHIATRY ASSESSMENT NOTE - NSBHMSEBEHAV_PSY_A_CORE
Detail Level: Detailed
Size Of Lesion: .4cm
Morphology Per Location (Optional): Light brown papule
Cooperative

## 2021-05-24 NOTE — PROGRESS NOTE ADULT - SUBJECTIVE AND OBJECTIVE BOX
Patient is a 25y old  Female who presents with a chief complaint of tylenol overdose and suicidal attempt (23 May 2021 06:23)      INTERVAL HPI/OVERNIGHT EVENTS:    Now admits that she took about 30 Tylenols; states that this was a suicide attempt, but "not planned; done on an impulse". Denies any prior suicide attempts. Pt unwilling or unable to quote any trigger that prompted this.     REVIEW OF SYSTEMS:   Remaining ROS negative    MEDICATIONS  (STANDING):    MEDICATIONS  (PRN):      Allergies    No Known Allergies    Intolerances        Vital Signs Last 24 Hrs  T(C): 36.4 (23 May 2021 12:17), Max: 36.5 (23 May 2021 03:51)  T(F): 97.5 (23 May 2021 12:17), Max: 97.7 (23 May 2021 03:51)  HR: 87 (23 May 2021 12:17) (82 - 107)  BP: 97/99 (23 May 2021 12:17) (92/72 - 115/65)  BP(mean): 76 (23 May 2021 06:12) (76 - 76)  RR: 16 (23 May 2021 12:17) (16 - 21)  SpO2: 99% (23 May 2021 12:17) (97% - 100%)    PHYSICAL EXAM:  GENERAL: NAD  HEAD:  Atraumatic, Normocephalic  EYES: EOMI, PERRLA, conjunctiva and sclera clear  ENT: O/P Clear  NECK: Supple, No JVD  NERVOUS SYSTEM:  No focal deficits  CHEST/LUNG: Clear to percussion bilaterally; No rales, rhonchi, wheezing  HEART: Regular rate and rhythm; No murmurs, rubs, or gallops  ABDOMEN: Soft, Nontender, Nondistended; Bowel sounds present  EXTREMITIES:  2+ Peripheral Pulses, No clubbing, cyanosis, or edema  SKIN: No rashes or lesions    LABS:                        12.3   10.64 )-----------( 297      ( 23 May 2021 04:28 )             37.0     05-    138  |  104  |  9   ----------------------------<  110<H>  3.0<L>   |  23  |  0.64    Ca    8.4<L>      23 May 2021 04:28    TPro  7.3  /  Alb  3.8  /  TBili  0.5  /  DBili  .13  /  AST  17  /  ALT  25  /  AlkPhos  59  05-23    PT/INR - ( 23 May 2021 04:28 )   PT: 12.7 sec;   INR: 1.10 ratio         PTT - ( 23 May 2021 04:28 )  PTT:21.6 sec  Urinalysis Basic - ( 23 May 2021 04:30 )    Color: Yellow / Appearance: Clear / S.005 / pH: x  Gluc: x / Ketone: Negative  / Bili: Negative / Urobili: Negative mg/dL   Blood: x / Protein: Negative mg/dL / Nitrite: Positive   Leuk Esterase: Small / RBC: 0-2 /HPF / WBC 3-5   Sq Epi: x / Non Sq Epi: Few / Bacteria: Many      CAPILLARY BLOOD GLUCOSE      POCT Blood Glucose.: 228 mg/dL (23 May 2021 06:22)      RADIOLOGY & ADDITIONAL TESTS:    Imaging Personally Reviewed:  [ ] YES  [ ] NO    Consultant(s) Notes Reviewed:  [ ] YES  [ ] NO    Care Discussed with Consultants/Other Providers [ ] YES  [ ] NO
Patient is a 25y old  Female who presents with a chief complaint of tylenol overdose and suicidal attempt (23 May 2021 14:26)      INTERVAL HPI/OVERNIGHT EVENTS:    Did very well w/ the Tylenol OD protocol; feels well; asking about discharge home.     REVIEW OF SYSTEMS:   Remaining ROS negative    MEDICATIONS  (STANDING):    MEDICATIONS  (PRN):  LORazepam     Tablet 1 milliGRAM(s) Oral every 6 hours PRN Anxiety      Allergies    No Known Allergies    Intolerances        Vital Signs Last 24 Hrs  T(C): 36.9 (24 May 2021 05:31), Max: 36.9 (23 May 2021 16:57)  T(F): 98.4 (24 May 2021 05:31), Max: 98.4 (23 May 2021 16:57)  HR: 71 (24 May 2021 05:31) (69 - 87)  BP: 100/70 (24 May 2021 05:31) (95/65 - 100/70)  BP(mean): --  RR: 20 (24 May 2021 05:31) (16 - 20)  SpO2: 98% (24 May 2021 05:31) (98% - 100%)    PHYSICAL EXAM:  GENERAL: NAD; alert, polite, appears somewhat cheerful  HEAD:  Atraumatic, Normocephalic  EYES: EOMI, PERRLA, conjunctiva and sclera clear  ENT: O/P Clear  NECK: Supple, No JVD  NERVOUS SYSTEM:  No focal deficits  CHEST/LUNG: Clear to percussion bilaterally; No rales, rhonchi, wheezing  HEART: Regular rate and rhythm; No murmurs, rubs, or gallops  ABDOMEN: Soft, Nontender, Nondistended; Bowel sounds present  EXTREMITIES:  2+ Peripheral Pulses, No clubbing, cyanosis, or edema  SKIN: No rashes or lesions    LABS:                        12.5   8.67  )-----------( 283      ( 24 May 2021 05:05 )             36.8     0524    139  |  105  |  4<L>  ----------------------------<  98  3.3<L>   |  26  |  0.57    Ca    8.5      24 May 2021 05:05  Mg     2.3         TPro  6.8  /  Alb  3.6  /  TBili  0.7  /  DBili  .16  /  AST  14<L>  /  ALT  18  /  AlkPhos  58  05-24    PT/INR - ( 23 May 2021 04:28 )   PT: 12.7 sec;   INR: 1.10 ratio         PTT - ( 23 May 2021 04:28 )  PTT:21.6 sec  Urinalysis Basic - ( 23 May 2021 04:30 )    Color: Yellow / Appearance: Clear / S.005 / pH: x  Gluc: x / Ketone: Negative  / Bili: Negative / Urobili: Negative mg/dL   Blood: x / Protein: Negative mg/dL / Nitrite: Positive   Leuk Esterase: Small / RBC: 0-2 /HPF / WBC 3-5   Sq Epi: x / Non Sq Epi: Few / Bacteria: Many      CAPILLARY BLOOD GLUCOSE      POCT Blood Glucose.: 118 mg/dL (23 May 2021 15:24)      RADIOLOGY & ADDITIONAL TESTS:    Imaging Personally Reviewed:  [ ] YES  [ ] NO    Consultant(s) Notes Reviewed:  [ ] YES  [ ] NO    Care Discussed with Consultants/Other Providers [ ] YES  [ ] NO

## 2021-05-24 NOTE — BH INPATIENT PSYCHIATRY ASSESSMENT NOTE - NSBHCHARTREVIEWLAB_PSY_A_CORE FT
12.5   8.67  )-----------( 283      ( 24 May 2021 05:05 )             36.8         139  |  105  |  4<L>  ----------------------------<  98  3.3<L>   |  26  |  0.57    Ca    8.5      24 May 2021 05:05  Mg     2.3         TPro  6.8  /  Alb  3.6  /  TBili  0.7  /  DBili  .16  /  AST  14<L>  /  ALT  18  /  AlkPhos  58      Urinalysis Basic - ( 23 May 2021 04:30 )    Color: Yellow / Appearance: Clear / S.005 / pH: x  Gluc: x / Ketone: Negative  / Bili: Negative / Urobili: Negative mg/dL   Blood: x / Protein: Negative mg/dL / Nitrite: Positive   Leuk Esterase: Small / RBC: 0-2 /HPF / WBC 3-5   Sq Epi: x / Non Sq Epi: Few / Bacteria: Many

## 2021-05-24 NOTE — BH CONSULTATION LIAISON PROGRESS NOTE - NSBHASSESSMENTFT_PSY_ALL_CORE
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 Namely, Patient took estimated 30 tabs of 500 mg Tylenol extra strength tabs about 30 minutes before ER presentation. Patient reported  feeling sad and depressed for the past 2 months, with decreased sleep, appetite, concentration; increased feeling of loneliness, racing thoughts. Patient also reported psychosocial stressors including excessive worries about the future, her financial situation, and relationship with  and his family who do not accept Patient and do not let he into their home. Yesterday, a clear trigger was identified as 's friend telling Patient that her  will divorce her. Patient   left suicide note/text to her  stating “no-one is responsible for my death or demise.”  rushed home, Patient opened the door and stated that she took half a bottle of medicine. In the ED, Tylenol level was 242 with normal LFTs. [facility] poison control consulted - STAT acetyl cysteine and Charcoal 50 g PO.   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 Namely, Patient took estimated 30 tabs of 500 mg Tylenol extra strength tabs about 30 minutes before ER presentation. Patient reported  feeling sad and depressed for the past 2 months, with decreased sleep, appetite, concentration; increased feeling of loneliness, racing thoughts. Patient also reported psychosocial stressors including excessive worries about the future, her financial situation, and relationship with  and his family who do not accept Patient and do not let he into their home. Yesterday, a clear trigger was identified as 's friend telling Patient that her  will divorce her. Patient   left suicide note/text to her  stating “no-one is responsible for my death or demise.”  rushed home, Patient opened the door and stated that she took half a bottle of medicine. In the ED, Tylenol level was 242 with normal LFTs. [facility] poison control consulted - STAT acetyl cysteine and Charcoal 50 g PO.   - 5/24/21 repeat Tylenol serum level < 2; patient medically cleared at this time   - Patient is at high acute risk for self-harm given impulsive suicide attempt via ingestion, limited social supports in [place], hostile in-laws, financial stressors, marital stressors, 2 months of worsening mood meeting MDD criteria with no access to current mental health services. Patient at this time is a threat to self and meets criteria for involuntary psychiatric hospitalization on a 9.27 status.

## 2021-05-24 NOTE — BH INPATIENT PSYCHIATRY ASSESSMENT NOTE - HPI (INCLUDE ILLNESS QUALITY, SEVERITY, DURATION, TIMING, CONTEXT, MODIFYING FACTORS, ASSOCIATED SIGNS AND SYMPTOMS)
24 yo F without formal past psych history, h/o remote NSSIB via superficial cutting, brought in by EMS s/p suicide attempt via Tylenol overdose, admitted medically, now transferred to psychiatry.    Patient seen and examined upon arrival to [address] [place] [address]. Slovak speaking,  ID #[id number]. She shares information about marital problems that she does not want shared with her . These problems have been the main stressor behind what she describes as worsening mood and hopelessness over the last few months. States that she met her  in 2017, then in 2018 they got  over the phone, and that this marriage was conducted without first getting approval from 's family. Later was told by  that he would love her if his family accepted the marriage, but that his family ultimately disapproved. While living in [place] for 2 years patient borrowed lots of money from her family to support  (though lied to family as to the reason why). Over this time period, and even more so recently, patient has felt controlled by  feels that she and has no freedom of her own. This has led to worsening mood and hopelessness over the last 2 months (see CL note below for depressive sx). Two weeks prior to SA, patient was upset about the situation and threatened to overdose on Tylenol, but says that her  didn't take her seriously. Then on day of SA, patient found out from sister in law that her  had said he would not be able to live with her anymore. This triggered her to take "a bunch" of tylenol, and then 2-3 min later take "a bunch" more. Lied down and closed her eyes, intended to die. Wanted to drink toilet  to die if Tylenol didn't work. Then patient's sister called, patient told her that she was not going to wake up the next day, patient's sister called ,  went home and then called EMS. Patient was first continued feeling hopeless afterward, but now is expressing hope for a better future after staff told her that social work can help her. Denies current SI on unit, expresses that she wants to get help and wants to be able to work and be more independent. Endorses history of superficial cutting of left wrist 8 years ago. Denies recent/ historical substance use other than occasional social alcohol. Denies AVH. No current meds other than vit C and D (has been taking since getting COVID 1.5 months ago).     As per CL assessment note:   "Pt. took estimated 30 tabs of 500 mg tylenol extra strength tabs about 30 minutes before ER presentation.  Patient states she has been feeling sad and depressed for the past 2 months, has decreased sleep, appetite, concentration, feeling more lonely, and has racing thoughts, excessive worries about the future, her finance situation, and relationship with . Patient is minimizing her stressors, please also see BTCM note.  She states she was at home and feeling sad and took 30 pills of tylenol 500mg extra strength as suicide attempt.  She denied stressor yesterday leading to overdose, said she told a friend who called 911.  However, per , she overdosed and left suicide note/text after 's friend told her that her  will divorce her yesterday.  She denied SI/HI/AVH at this time."

## 2021-05-24 NOTE — PROGRESS NOTE ADULT - PROBLEM SELECTOR PROBLEM 1
Tylenol overdose, intentional self-harm, initial encounter
Tylenol overdose, intentional self-harm, initial encounter

## 2021-05-24 NOTE — PROGRESS NOTE ADULT - ASSESSMENT
26 yo female with PMH of depression admitted for tylenol overdose and suicidal attempts    tylenol level 242. LFT wnl.  [facility] poison control consulted for Tylenol overdose, Pabu:  agree with NAC asap, admission, recommend charcoal 50 g oral as well, given normal mental status, repeat tylenol level recommended at 4 hour corinne at 7 am      
26 yo female with PMH of depression admitted for tylenol overdose and suicidal attempts    tylenol level 242. LFT wnl.  [facility] poison control consulted for Tylenol overdose, Pabu:  agree with NAC asap, admission, recommend charcoal 50 g oral as well, given normal mental status, repeat tylenol level recommended at 4 hour corinne at 7 am

## 2021-05-24 NOTE — CHART NOTE - NSCHARTNOTEFT_GEN_A_CORE
Medicine PA Note    Received sign out from Dr Jackson to follow up tynelol levels at 22:00 and 03:00 . Both were < 2 and LFTs normal as well.  continue to monitor the patient.    [facility]

## 2021-05-24 NOTE — PROGRESS NOTE ADULT - PROBLEM SELECTOR PLAN 1
Admitted to [facility]  Given N-Acetylcysteine per Poison Control protocol  Initial ingestion approx. 3:20 AM on 5/23  Initial LFTs normal and have remained so;  initial Tylenol level 242 --> 131, 19, <2, <2, <2  No evidence of any acute or lasting complications.  Appreciate the support of Kendy from [facility] Poison Control at [phone]

## 2021-05-24 NOTE — DISCHARGE NOTE PROVIDER - NSDCFUSCHEDAPPT_GEN_ALL_CORE_FT
Left message informing leah of PT orders  
Please advise  
Sanam from [facility] called.  She stated she evaluated patient today for her hand.  During evaluation patient mentioned she's had several falls.  Sanam is requesting an order for PT for falls and risk assessment.    Please call Sanam with any questions, [phone].  
Yes okay and ordered the therapy  
LYDIA [facility] ; 05/24/2021 ; VI PreAdmits

## 2021-05-24 NOTE — BH INPATIENT PSYCHIATRY ASSESSMENT NOTE - CURRENT MEDICATION
MEDICATIONS  (STANDING):  ascorbic acid 500 milliGRAM(s) Oral daily  cholecalciferol 1000 Unit(s) Oral daily    MEDICATIONS  (PRN):  hydrOXYzine hydrochloride 25 milliGRAM(s) Oral every 6 hours PRN anxiety  LORazepam     Tablet 1 milliGRAM(s) Oral every 6 hours PRN severe anxiety  LORazepam   Injectable 2 milliGRAM(s) IntraMuscular once PRN severe agitation  melatonin. 3 milliGRAM(s) Oral at bedtime PRN Insomnia

## 2021-05-24 NOTE — BH CONSULTATION LIAISON PROGRESS NOTE - NSBHCHARTREVIEWVS_PSY_A_CORE FT
Vital Signs Last 24 Hrs  T(C): 36.9 (24 May 2021 05:31), Max: 36.9 (23 May 2021 16:57)  T(F): 98.4 (24 May 2021 05:31), Max: 98.4 (23 May 2021 16:57)  HR: 71 (24 May 2021 05:31) (69 - 81)  BP: 100/70 (24 May 2021 05:31) (95/65 - 100/70)  BP(mean): --  RR: 20 (24 May 2021 05:31) (18 - 20)  SpO2: 98% (24 May 2021 05:31) (98% - 100%)

## 2021-05-24 NOTE — BH INPATIENT PSYCHIATRY ASSESSMENT NOTE - RISK ASSESSMENT
Patient is at acutely elevated risk of harm to self given that she is status post SA is s/o activating stressor of marital problems without current resolution and worsening depression with hopelessness. Chronic risk factors include h/o NSSIB, lack of family support, lack of connection to outpatient treatment. As such, she requires inpatient admission for safety and stabilization.

## 2021-05-24 NOTE — BH INPATIENT PSYCHIATRY ASSESSMENT NOTE - NSBHCHARTREVIEWVS_PSY_A_CORE FT
Vital Signs Last 24 Hrs  T(C): 36.4 (24 May 2021 20:12), Max: 36.9 (24 May 2021 05:31)  T(F): 97.6 (24 May 2021 20:12), Max: 98.4 (24 May 2021 05:31)  HR: 74 (24 May 2021 16:12) (71 - 95)  BP: 106/73 (24 May 2021 16:12) (95/65 - 106/73)  BP(mean): --  RR: 18 (24 May 2021 16:12) (18 - 20)  SpO2: 98% (24 May 2021 16:12) (98% - 99%)

## 2021-05-24 NOTE — BH CONSULTATION LIAISON PROGRESS NOTE - NSBHCONSTRANSREASON_PSY_ALL_CORE
No bed at this hospital and acuity of patient symptomatology warrants immediate transfer to inpatient care

## 2021-05-24 NOTE — DISCHARGE NOTE PROVIDER - NSDCCPCAREPLAN_GEN_ALL_CORE_FT
PRINCIPAL DISCHARGE DIAGNOSIS  Diagnosis: Tylenol overdose, intentional self-harm, initial encounter  Assessment and Plan of Treatment:       SECONDARY DISCHARGE DIAGNOSES  Diagnosis: Acute cystitis without hematuria  Assessment and Plan of Treatment:

## 2021-05-24 NOTE — PROGRESS NOTE ADULT - PROBLEM SELECTOR PLAN 2
psych consulted for suicidal depression (Telepsych --> [facility])  considering inpatient vs outpatient follow up    EKG from 5/23 normal  COVID from 5/23 negative  LFTs remain normal  Tylenol level now undetectable    If psychiatry feels inpatient psychiatric treatment indicated, I would certainly agree.  Medically stable for transfer to inpatient psych.
psych consult for depression pending  consider inpatient vs outpatient follow up

## 2021-05-24 NOTE — DISCHARGE NOTE PROVIDER - NSDCHOSPICE_GEN_A_CORE
Telephone Encounter by Precious Blackwood at 01/29/18 07:48 AM     Author:  Precious Blackwood Service:  (none) Author Type:  Patient      Filed:  01/29/18 07:53 AM Encounter Date:  1/29/2018 Status:  Signed     :  Precious Blackwood (Patient )              JUANA SANDOVAL    Patient Age: 74 year old    ACCT STATUS:   MESSAGE:[SW1.1T]   Pt's wife Marcelina (VOF) states Pt cannot make appointment today due to work.   Pt has been NOS in [facility].    Marcelina states they will call back to reschedule.[SW1.1M]    Next and Last Visit with Provider and Department  Next visit with YUSUF SELLERS is on No match found  Next visit with UROLOGY is on No match found  Last visit with YUSUF SELLERS was on 07/26/2016 at  9:20 AM in UROLOGY [facility]  Last visit with UROLOGY was on 07/26/2016 at  9:20 AM in UROLOGY [facility]     WEIGHT AND HEIGHT: As of 12/18/2017 weight is 240 lbs.(108.863 kg).   BMI is 33.95 kg/(m^2) calculated from:     Height 5' 9\" (1.753 m) as of 4/10/17     Weight 230 lb (104.327 kg) as of 4/10/17      No Known Allergies  Current outpatient prescriptions       Medication  Sig Dispense Refill   • Insulin Glargine (LANTUS SOLOSTAR) 100 UNIT/ML SOPN Inject 10-25 Units into the skin nightly. 45 mL 1   • Insulin Pen Needle 31G X 4 MM MISC Use daily as directed with Basaglar. Diagnosis:  Diabetes mellitus type II, uncontrolled E11.65 50 Each 5   • furosemide (LASIX) 20 MG tablet Take 1 Tab by mouth daily. 90 Tab 3   • potassium chloride SA (K-DUR,KLOR-CON) 20 MEQ tablet TAKE 1 TABLET BY MOUTH EVERY  Tab 11   • Tamsulosin HCl 0.4 MG CAPS TAKE 1 CAPSULE BY MOUTH EVERY DAY 30 Cap 2   • atorvastatin (LIPITOR) 40 MG tablet Take 1 Tab by mouth daily. 90 Tab 3   • glimepiride (AMARYL) 4 MG tablet Take 1 Tab by mouth every morning before breakfast. 90 Tab 3   • pioglitazone (ACTOS) 45 MG tablet Take 1 Tab by mouth daily. 90 Tab 3   • metoprolol (LOPRESSOR) 25 MG tablet Take 1 Tab by mouth  2 (two) times daily. 180 Tab 3   • metformin (GLUCOPHAGE) 1000 MG tablet Take 1 Tab by mouth 2 (two) times daily with meals. 180 Tab 3   • lisinopril (PRINIVIL,ZESTRIL) 5 MG tablet Take 1 Tab by mouth daily. 90 Each 3   • Aspirin 325 MG OR TABS 1 tablet daily 120 Tab 0      [facility]Y to use: [facility] [address]          Pharmacy preference(s) on file:    [facility] [address]    CALL BACK INFO:[SW1.1T] Ok to leave response (including medical information) on answering machine[SW1.1M]  ROUTING:[SW1.1T] Patient's physician/staff[SW1.1M]        PCP: Ambrocio Callaway MD         INS: Payor: MEDICARE - ASSIGNED / Plan: *No Plan* / Product Type: *No Product type* / Note: This is the primary coverage, but no account was found for this location or the patient's primary location.   ADDRESS:  [address] [place] [id number][SW1.1T]         Revision History        User Key Date/Time User Provider Type Action    > SW1.1 01/29/18 07:53 AM Precious Blackwood Patient  Sign    M - Manual, T - Template             No

## 2021-05-25 LAB
-  AMIKACIN: SIGNIFICANT CHANGE UP
-  AMOXICILLIN/CLAVULANIC ACID: SIGNIFICANT CHANGE UP
-  AMPICILLIN/SULBACTAM: SIGNIFICANT CHANGE UP
-  AMPICILLIN: SIGNIFICANT CHANGE UP
-  AZTREONAM: SIGNIFICANT CHANGE UP
-  CEFAZOLIN: SIGNIFICANT CHANGE UP
-  CEFEPIME: SIGNIFICANT CHANGE UP
-  CEFOXITIN: SIGNIFICANT CHANGE UP
-  CEFTRIAXONE: SIGNIFICANT CHANGE UP
-  CIPROFLOXACIN: SIGNIFICANT CHANGE UP
-  ERTAPENEM: SIGNIFICANT CHANGE UP
-  GENTAMICIN: SIGNIFICANT CHANGE UP
-  IMIPENEM: SIGNIFICANT CHANGE UP
-  LEVOFLOXACIN: SIGNIFICANT CHANGE UP
-  MEROPENEM: SIGNIFICANT CHANGE UP
-  NITROFURANTOIN: SIGNIFICANT CHANGE UP
-  PIPERACILLIN/TAZOBACTAM: SIGNIFICANT CHANGE UP
-  TIGECYCLINE: SIGNIFICANT CHANGE UP
-  TOBRAMYCIN: SIGNIFICANT CHANGE UP
-  TRIMETHOPRIM/SULFAMETHOXAZOLE: SIGNIFICANT CHANGE UP
COVID-19 SPIKE DOMAIN AB INTERP: POSITIVE
COVID-19 SPIKE DOMAIN ANTIBODY RESULT: >250 U/ML — HIGH
CULTURE RESULTS: SIGNIFICANT CHANGE UP
METHOD TYPE: SIGNIFICANT CHANGE UP
ORGANISM # SPEC MICROSCOPIC CNT: SIGNIFICANT CHANGE UP
ORGANISM # SPEC MICROSCOPIC CNT: SIGNIFICANT CHANGE UP
SARS-COV-2 IGG+IGM SERPL QL IA: >250 U/ML — HIGH
SARS-COV-2 IGG+IGM SERPL QL IA: POSITIVE
SPECIMEN SOURCE: SIGNIFICANT CHANGE UP

## 2021-05-25 PROCEDURE — 99223 1ST HOSP IP/OBS HIGH 75: CPT

## 2021-05-25 PROCEDURE — 99232 SBSQ HOSP IP/OBS MODERATE 35: CPT

## 2021-05-25 RX ORDER — SODIUM CHLORIDE 0.65 %
1 AEROSOL, SPRAY (ML) NASAL EVERY 8 HOURS
Refills: 0 | Status: DISCONTINUED | OUTPATIENT
Start: 2021-05-25 | End: 2021-06-04

## 2021-05-25 RX ORDER — MIRTAZAPINE 45 MG/1
7.5 TABLET, ORALLY DISINTEGRATING ORAL AT BEDTIME
Refills: 0 | Status: DISCONTINUED | OUTPATIENT
Start: 2021-05-25 | End: 2021-05-28

## 2021-05-25 RX ORDER — LIDOCAINE 4 G/100G
1 CREAM TOPICAL EVERY 24 HOURS
Refills: 0 | Status: DISCONTINUED | OUTPATIENT
Start: 2021-05-25 | End: 2021-05-26

## 2021-05-25 RX ADMIN — Medication 500 MILLIGRAM(S): at 11:55

## 2021-05-25 RX ADMIN — Medication 5 MILLIGRAM(S): at 22:15

## 2021-05-25 RX ADMIN — Medication 1000 UNIT(S): at 11:55

## 2021-05-25 RX ADMIN — MIRTAZAPINE 7.5 MILLIGRAM(S): 45 TABLET, ORALLY DISINTEGRATING ORAL at 22:15

## 2021-05-25 NOTE — PSYCHIATRIC REHAB INITIAL EVALUATION - NSBHPRRECOMMEND_PSY_ALL_CORE
Writer met with patient in order to orient patient to unit, provide patient with a unit schedule and introduce patient to psychiatric rehabilitation staff and department functions. Patient was receptive to orientation, patient was verbal, polite, and forthcoming with personal data and information surrounding admitting circumstances during initial interview. Writer collaborated with patient to select an appropriate psychiatric rehabilitation goal. Psychiatric rehabilitation staff will continue to engage patient daily in order to develop therapeutic rapport. In response to COVID19, unit programming will be re-evaluated on a consistent basis in effort to maintain safety guidelines.

## 2021-05-25 NOTE — BH INPATIENT PSYCHIATRY PROGRESS NOTE - MSE UNSTRUCTURED FT
The patient appears stated age, fair hygiene, dressed appropriately.  She was calm, cooperative with the interview.  She maintained appropriate eye contact.  No psychomotor agitation or retardation.  Steady gait.  The patient’s speech was fluent in Maori, normal in tone, rate, and volume.  The patient’s mood is “depressed.”  Affect is constricted, stable, appropriate.  The patient’s thoughts are goal directed.  She denies any delusions or hallucinations.  She denies any current suicidal or homicidal ideation, intent, or plan.  Insight is fair.  Judgment is fair.  Impulse control has been fair on the unit.

## 2021-05-25 NOTE — CONSULT NOTE ADULT - ASSESSMENT
24 y/o F with remote history of cutting 8 years ago who presents after SA from acetaminophen overdose now with back pain.     #Acetaminophen overdose,   - Tylenol level as high as 242. S/p NAC. Level trended and was down to undetectable levels. LFTs wnl.   - No longer need to trend LFTs  - No further management required.    #Back pain,   - Pt describes upper back pain without focal deficits. Denies trauma. Likely musculoskeletal.   - Recommend heat packs and lidocaine patch.     #Nasal pain,   - Reporting nasal/burning pain. Erythematous mucosa.   - Saline nasal spray as needed     #Depression/SA  - Care as per primary team.

## 2021-05-25 NOTE — BH SOCIAL WORK INITIAL PSYCHOSOCIAL EVALUATION - NSBHEDULITERACY_PSY_ALL_CORE
Pt is from [facility]/Able to only read/write in language other than English (specify)

## 2021-05-25 NOTE — CONSULT NOTE ADULT - SUBJECTIVE AND OBJECTIVE BOX
Hospitalist Consult Note    HPI: 24 y/o F with remote history of cutting 8 years ago who presents after SA from acetaminophen overdose. The patient took 30 - 500mg pills and then presented to the ED 30 minutes later. She reports HA, nausea and nostril pain this morning that has resolved. She says that she has upper back pain in the middle of her back. No weakness, numbness/tingling of her extremities. She had COVID 1.5 months ago and now takes vitamin C and D. She denies chest pain/SOB, abdominal pain, vomiting, diarrhea/constipation.     PAST MEDICAL & SURGICAL HISTORY:  2019 novel coronavirus disease (COVID-19)    No significant past surgical history        Review of Systems:   CONSTITUTIONAL: No fever, weight loss, or fatigue  EYES: No eye pain, visual disturbances, or discharge  ENMT:  No difficulty hearing, tinnitus, vertigo; No sinus or throat pain  NECK: No pain or stiffness  RESPIRATORY: No cough, wheezing, chills or hemoptysis; No shortness of breath  CARDIOVASCULAR: No chest pain, palpitations, dizziness, or leg swelling  GASTROINTESTINAL: No abdominal or epigastric pain. No nausea, vomiting, or hematemesis; No diarrhea or constipation. No melena or hematochezia.  GENITOURINARY: No dysuria, frequency, hematuria, or incontinence  NEUROLOGICAL: No headaches, memory loss, loss of strength, numbness, or tremors  SKIN: No itching, burning, rashes, or lesions   LYMPH NODES: No enlarged glands  ENDOCRINE: No heat or cold intolerance; No hair loss  MUSCULOSKELETAL: No joint pain or swelling; No muscle, back, or extremity pain  HEME/LYMPH: No easy bruising, or bleeding gums  ALLERY AND IMMUNOLOGIC: No hives or eczema    Allergies    Beef (Eye Irritation; Rash)  No Known Drug Allergies  reports pumpkin and lentil allergy causing itchy eyes and rash (Eye Irritation; Rash)  shellfish (Eye Irritation; Rash)    Intolerances        Social History:     FAMILY HISTORY:      MEDICATIONS  (STANDING):  ascorbic acid 500 milliGRAM(s) Oral daily  cholecalciferol 1000 Unit(s) Oral daily  melatonin. 5 milliGRAM(s) Oral at bedtime  mirtazapine 7.5 milliGRAM(s) Oral at bedtime    MEDICATIONS  (PRN):  hydrOXYzine hydrochloride 25 milliGRAM(s) Oral every 6 hours PRN anxiety  LORazepam     Tablet 1 milliGRAM(s) Oral every 6 hours PRN severe anxiety  LORazepam   Injectable 2 milliGRAM(s) IntraMuscular once PRN severe agitation      Vital Signs Last 24 Hrs  T(C): 36.4 (25 May 2021 06:20), Max: 36.7 (24 May 2021 16:12)  T(F): 97.5 (25 May 2021 06:20), Max: 98.1 (24 May 2021 16:12)  HR: 74 (24 May 2021 16:12) (74 - 74)  BP: 106/73 (24 May 2021 16:12) (106/73 - 106/73)  BP(mean): --  RR: 18 (24 May 2021 16:12) (18 - 18)  SpO2: 98% (24 May 2021 16:12) (98% - 98%)  CAPILLARY BLOOD GLUCOSE            PHYSICAL EXAM:  GENERAL: NAD, well-developed  HEAD:  Atraumatic, Normocephalic. Bilateral nares with erythematous/dry mucosa.   EYES: EOMI, conjunctiva and sclera clear  NECK: Supple, No JVD  CHEST/LUNG: Clear to auscultation bilaterally; No wheeze  HEART: Regular rate and rhythm; No murmurs, rubs, or gallops  ABDOMEN: Soft, Nontender, Nondistended; Bowel sounds present  EXTREMITIES:  2+ Peripheral Pulses, No clubbing, cyanosis, or edema  NEUROLOGY: non-focal  Back: paraspinal tenderness palpated over cervical spine.   SKIN: No rashes or lesions    LABS:                        12.5   8.67  )-----------( 283      ( 24 May 2021 05:05 )             36.8     05-24    139  |  105  |  4<L>  ----------------------------<  98  3.3<L>   |  26  |  0.57    Ca    8.5      24 May 2021 05:05  Mg     2.3     05-24    TPro  6.8  /  Alb  3.6  /  TBili  0.7  /  DBili  .16  /  AST  14<L>  /  ALT  18  /  AlkPhos  58  05-24              EKG(personally reviewed):    RADIOLOGY & ADDITIONAL TESTS:    Imaging Personally Reviewed:    Consultant(s) Notes Reviewed:      Care Discussed with Consultants/Other Providers:

## 2021-05-25 NOTE — BH INPATIENT PSYCHIATRY PROGRESS NOTE - NSBHFUPINTERVALHXFT_PSY_A_CORE
Patient seen for follow up for depression, chart reviewed, and case discussed with treatment team.  No events reported overnight.  Spoke with patient using telephone Estonian  #[phone].  The patient was able to confirm her history as reported in the psychiatric assessment.  She continues to be depressed, concerned about her   her, and not knowing what she would do then.  She is eager for help. She reports poor sleep, appetite, and energy.  She denies current SI in the hospital.  After discussion, she is agreeable to starting medications.  Encouraged the patient to participate in the milieu.

## 2021-05-26 PROCEDURE — 99232 SBSQ HOSP IP/OBS MODERATE 35: CPT | Mod: 25

## 2021-05-26 PROCEDURE — 90853 GROUP PSYCHOTHERAPY: CPT

## 2021-05-26 RX ORDER — LIDOCAINE 4 G/100G
1 CREAM TOPICAL DAILY
Refills: 0 | Status: DISCONTINUED | OUTPATIENT
Start: 2021-05-26 | End: 2021-06-04

## 2021-05-26 RX ADMIN — Medication 1000 UNIT(S): at 09:09

## 2021-05-26 RX ADMIN — Medication 500 MILLIGRAM(S): at 09:10

## 2021-05-26 RX ADMIN — MIRTAZAPINE 7.5 MILLIGRAM(S): 45 TABLET, ORALLY DISINTEGRATING ORAL at 21:26

## 2021-05-26 NOTE — BH INPATIENT PSYCHIATRY PROGRESS NOTE - MSE UNSTRUCTURED FT
The patient appears stated age, fair hygiene, dressed appropriately.  She was calm, cooperative with the interview.  She maintained appropriate eye contact.  No psychomotor agitation or retardation.  Steady gait.  The patient’s speech was fluent in Croatian, normal in tone, rate, and volume.  The patient’s mood is “depressed.”  Affect is constricted, stable, appropriate.  The patient’s thoughts are goal directed.  She denies any delusions or hallucinations.  She denies any current suicidal or homicidal ideation, intent, or plan.  Insight is fair.  Judgment is fair.  Impulse control has been fair on the unit.

## 2021-05-26 NOTE — BH INPATIENT PSYCHIATRY PROGRESS NOTE - NSBHFUPINTERVALHXFT_PSY_A_CORE
Patient seen for follow up for depression, chart reviewed, and case discussed with treatment team.  No events reported overnight.  Spoke with patient using telephone [place]  #[phone].  The patient continues to feel depressed.  Her  came to visit yesterday, but came late, and then was not able to come and speak with her, which was very upsetting for the patient.  She continues to feel hopeless and helpless, but denies SI in the hospital.  She was able to sleep last night, but reports some sedation this morning.  She has been eating well.  She was compliant with medication.

## 2021-05-27 LAB
APPEARANCE UR: CLEAR — SIGNIFICANT CHANGE UP
BACTERIA # UR AUTO: ABNORMAL
BILIRUB UR-MCNC: NEGATIVE — SIGNIFICANT CHANGE UP
COLOR SPEC: SIGNIFICANT CHANGE UP
DIFF PNL FLD: NEGATIVE — SIGNIFICANT CHANGE UP
EPI CELLS # UR: 1 /HPF — SIGNIFICANT CHANGE UP (ref 0–5)
GLUCOSE UR QL: NEGATIVE — SIGNIFICANT CHANGE UP
HYALINE CASTS # UR AUTO: 2 /LPF — SIGNIFICANT CHANGE UP (ref 0–7)
KETONES UR-MCNC: NEGATIVE — SIGNIFICANT CHANGE UP
LEUKOCYTE ESTERASE UR-ACNC: ABNORMAL
NITRITE UR-MCNC: POSITIVE
PH UR: 6.5 — SIGNIFICANT CHANGE UP (ref 5–8)
PROT UR-MCNC: NEGATIVE — SIGNIFICANT CHANGE UP
RBC CASTS # UR COMP ASSIST: 1 /HPF — SIGNIFICANT CHANGE UP (ref 0–4)
SP GR SPEC: 1.01 — SIGNIFICANT CHANGE UP (ref 1.01–1.02)
UROBILINOGEN FLD QL: SIGNIFICANT CHANGE UP
WBC UR QL: 32 /HPF — HIGH (ref 0–5)

## 2021-05-27 PROCEDURE — 99232 SBSQ HOSP IP/OBS MODERATE 35: CPT

## 2021-05-27 RX ORDER — FLUCONAZOLE 150 MG/1
150 TABLET ORAL ONCE
Refills: 0 | Status: COMPLETED | OUTPATIENT
Start: 2021-05-27 | End: 2021-05-27

## 2021-05-27 RX ORDER — CIPROFLOXACIN LACTATE 400MG/40ML
250 VIAL (ML) INTRAVENOUS EVERY 12 HOURS
Refills: 0 | Status: COMPLETED | OUTPATIENT
Start: 2021-05-27 | End: 2021-06-01

## 2021-05-27 RX ORDER — POLYETHYLENE GLYCOL 3350 17 G/17G
17 POWDER, FOR SOLUTION ORAL DAILY
Refills: 0 | Status: DISCONTINUED | OUTPATIENT
Start: 2021-05-27 | End: 2021-06-04

## 2021-05-27 RX ADMIN — Medication 1000 UNIT(S): at 09:15

## 2021-05-27 RX ADMIN — FLUCONAZOLE 150 MILLIGRAM(S): 150 TABLET ORAL at 12:24

## 2021-05-27 RX ADMIN — Medication 500 MILLIGRAM(S): at 09:14

## 2021-05-27 RX ADMIN — MIRTAZAPINE 7.5 MILLIGRAM(S): 45 TABLET, ORALLY DISINTEGRATING ORAL at 21:11

## 2021-05-27 RX ADMIN — Medication 250 MILLIGRAM(S): at 21:11

## 2021-05-27 RX ADMIN — POLYETHYLENE GLYCOL 3350 17 GRAM(S): 17 POWDER, FOR SOLUTION ORAL at 18:59

## 2021-05-27 NOTE — BH INPATIENT PSYCHIATRY PROGRESS NOTE - MSE UNSTRUCTURED FT
The patient appears stated age, fair hygiene, dressed appropriately.  She was calm, cooperative with the interview.  She maintained appropriate eye contact.  No psychomotor agitation or retardation.  Steady gait.  The patient’s speech was fluent in French, normal in tone, rate, and volume.  The patient’s mood is “a little better.”  Affect is constricted, stable, appropriate.  The patient’s thoughts are goal directed.  She denies any delusions or hallucinations.  She denies any current suicidal or homicidal ideation, intent, or plan.  Insight is fair.  Judgment is fair.  Impulse control has been fair on the unit.

## 2021-05-27 NOTE — BH INPATIENT PSYCHIATRY PROGRESS NOTE - NSBHFUPINTERVALHXFT_PSY_A_CORE
Patient seen for follow up for depression, chart reviewed, and case discussed with treatment team.  No events reported overnight.  Spoke with patient using telephone [facility]  #[phone].  The patient states she is feeling a little better today.  She had a more positive conversation with her  this morning, and is feeling a little more hopeful.  However, she continues to be wary, and is not sure how she wants to go forward.  She is planning on calling her family tomorrow in [facility] to discuss.  She remains depressed, but denies SI.  Behavior has been well controlled.  She was able to sleep last night, less sedation this morning.  She has been eating well.  She was compliant with medication.

## 2021-05-28 PROCEDURE — 99232 SBSQ HOSP IP/OBS MODERATE 35: CPT | Mod: 25

## 2021-05-28 PROCEDURE — 90853 GROUP PSYCHOTHERAPY: CPT

## 2021-05-28 RX ORDER — MIRTAZAPINE 45 MG/1
15 TABLET, ORALLY DISINTEGRATING ORAL AT BEDTIME
Refills: 0 | Status: DISCONTINUED | OUTPATIENT
Start: 2021-05-28 | End: 2021-06-04

## 2021-05-28 RX ADMIN — MIRTAZAPINE 15 MILLIGRAM(S): 45 TABLET, ORALLY DISINTEGRATING ORAL at 21:17

## 2021-05-28 RX ADMIN — Medication 250 MILLIGRAM(S): at 21:16

## 2021-05-28 RX ADMIN — Medication 500 MILLIGRAM(S): at 08:33

## 2021-05-28 RX ADMIN — Medication 250 MILLIGRAM(S): at 08:34

## 2021-05-28 RX ADMIN — Medication 1000 UNIT(S): at 08:33

## 2021-05-28 NOTE — BH INPATIENT PSYCHIATRY PROGRESS NOTE - MSE UNSTRUCTURED FT
The patient appears stated age, fair hygiene, dressed appropriately.  She was calm, cooperative with the interview.  She maintained appropriate eye contact.  No psychomotor agitation or retardation.  Steady gait.  The patient’s speech was fluent in Upper sorbian, normal in tone, rate, and volume.  The patient’s mood is “a little better.”  Affect is constricted, stable, appropriate.  The patient’s thoughts are goal directed.  She denies any delusions or hallucinations.  She denies any current suicidal or homicidal ideation, intent, or plan.  Insight is fair.  Judgment is fair.  Impulse control has been fair on the unit.

## 2021-05-28 NOTE — BH INPATIENT PSYCHIATRY PROGRESS NOTE - NSBHFUPINTERVALHXFT_PSY_A_CORE
Patient seen for follow up for depression, chart reviewed, and case discussed with treatment team.  No events reported overnight.  Spoke with patient using telephone [facility]  #[phone].  The patient states she is feeling a little better today, a little more hopeful.  She did not speak with her  yesterday, but feels they need some space.  She denies SI today, and behavior has been well controlled.  She is hoping to call her family today in [facility] to discuss her situation.  She was able to sleep last night, and has been eating better.  She has been visible on the unit, attending some groups.  She was compliant with medication, tolerating it well, denies excessive sedation.

## 2021-05-29 RX ADMIN — Medication 1000 UNIT(S): at 08:53

## 2021-05-29 RX ADMIN — LIDOCAINE 1 PATCH: 4 CREAM TOPICAL at 20:12

## 2021-05-29 RX ADMIN — MIRTAZAPINE 15 MILLIGRAM(S): 45 TABLET, ORALLY DISINTEGRATING ORAL at 20:57

## 2021-05-29 RX ADMIN — Medication 500 MILLIGRAM(S): at 08:54

## 2021-05-29 RX ADMIN — Medication 250 MILLIGRAM(S): at 20:56

## 2021-05-29 RX ADMIN — LIDOCAINE 1 PATCH: 4 CREAM TOPICAL at 08:54

## 2021-05-29 RX ADMIN — Medication 250 MILLIGRAM(S): at 08:53

## 2021-05-30 RX ADMIN — Medication 500 MILLIGRAM(S): at 10:27

## 2021-05-30 RX ADMIN — Medication 250 MILLIGRAM(S): at 10:27

## 2021-05-30 RX ADMIN — MIRTAZAPINE 15 MILLIGRAM(S): 45 TABLET, ORALLY DISINTEGRATING ORAL at 21:48

## 2021-05-30 RX ADMIN — Medication 1000 UNIT(S): at 10:27

## 2021-05-30 RX ADMIN — Medication 250 MILLIGRAM(S): at 21:48

## 2021-05-31 RX ADMIN — Medication 500 MILLIGRAM(S): at 08:37

## 2021-05-31 RX ADMIN — MIRTAZAPINE 15 MILLIGRAM(S): 45 TABLET, ORALLY DISINTEGRATING ORAL at 21:17

## 2021-05-31 RX ADMIN — Medication 250 MILLIGRAM(S): at 21:19

## 2021-05-31 RX ADMIN — Medication 250 MILLIGRAM(S): at 08:37

## 2021-05-31 RX ADMIN — Medication 1000 UNIT(S): at 08:37

## 2021-06-01 DIAGNOSIS — T39.1X2A POISONING BY 4-AMINOPHENOL DERIVATIVES, INTENTIONAL SELF-HARM, INITIAL ENCOUNTER: ICD-10-CM
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
## 2021-06-01 DIAGNOSIS — Z20.822 CONTACT WITH AND (SUSPECTED) EXPOSURE TO COVID-19: ICD-10-CM

## 2021-06-01 DIAGNOSIS — N30.00 ACUTE CYSTITIS WITHOUT HEMATURIA: ICD-10-CM

## 2021-06-01 DIAGNOSIS — F32.9 MAJOR DEPRESSIVE DISORDER, SINGLE EPISODE, UNSPECIFIED: ICD-10-CM

## 2021-06-01 PROCEDURE — 90853 GROUP PSYCHOTHERAPY: CPT

## 2021-06-01 PROCEDURE — 99232 SBSQ HOSP IP/OBS MODERATE 35: CPT

## 2021-06-01 RX ORDER — SENNA PLUS 8.6 MG/1
2 TABLET ORAL AT BEDTIME
Refills: 0 | Status: DISCONTINUED | OUTPATIENT
Start: 2021-06-01 | End: 2021-06-04

## 2021-06-01 RX ORDER — MAGNESIUM HYDROXIDE 400 MG/1
30 TABLET, CHEWABLE ORAL DAILY
Refills: 0 | Status: DISCONTINUED | OUTPATIENT
Start: 2021-06-01 | End: 2021-06-04

## 2021-06-01 RX ADMIN — Medication 1000 UNIT(S): at 13:03

## 2021-06-01 RX ADMIN — Medication 500 MILLIGRAM(S): at 10:30

## 2021-06-01 RX ADMIN — MIRTAZAPINE 15 MILLIGRAM(S): 45 TABLET, ORALLY DISINTEGRATING ORAL at 21:37

## 2021-06-01 RX ADMIN — Medication 250 MILLIGRAM(S): at 13:04

## 2021-06-01 RX ADMIN — SENNA PLUS 2 TABLET(S): 8.6 TABLET ORAL at 21:36

## 2021-06-01 NOTE — BH INPATIENT PSYCHIATRY PROGRESS NOTE - NSBHFUPINTERVALHXFT_PSY_A_CORE
Patient seen for follow up for depression, chart reviewed, and case discussed with treatment team.  No events reported overnight.  Spoke with patient using telephone Kyrgyz  #[phone].  The patient states she is starting to feel better.  Her  visited yesterday, which went fairly well.  He will continue to live with his family, and she will continue to live by herself, but she is feeling more hopeful, and is hoping to be able to establish herself, and find employment.    The patient denies SI today, and behavior has been well controlled.  She has been sleeping well, but does complain of some sedation during the day - patient states it is manageable and is agreeable to continuing medication at current dose.  She has been eating well.  She has been visible on the unit, attending some groups.  She has been compliant with medications.

## 2021-06-01 NOTE — BH INPATIENT PSYCHIATRY PROGRESS NOTE - MSE UNSTRUCTURED FT
The patient appears stated age, fair hygiene, dressed appropriately.  She was calm, cooperative with the interview.  She maintained appropriate eye contact.  No psychomotor agitation or retardation.  Steady gait.  The patient’s speech was fluent in Yi, normal in tone, rate, and volume.  The patient’s mood is “better.”  Affect is constricted, stable, appropriate.  The patient’s thoughts are goal directed.  She denies any delusions or hallucinations.  She denies any current suicidal or homicidal ideation, intent, or plan.  Insight is fair.  Judgment is fair.  Impulse control has been fair on the unit.

## 2021-06-02 PROCEDURE — 99232 SBSQ HOSP IP/OBS MODERATE 35: CPT | Mod: 25

## 2021-06-02 PROCEDURE — 90853 GROUP PSYCHOTHERAPY: CPT

## 2021-06-02 RX ORDER — MULTIVIT WITH MIN/MFOLATE/K2 340-15/3 G
1 POWDER (GRAM) ORAL ONCE
Refills: 0 | Status: COMPLETED | OUTPATIENT
Start: 2021-06-02 | End: 2021-06-02

## 2021-06-02 RX ADMIN — Medication 1000 UNIT(S): at 08:08

## 2021-06-02 RX ADMIN — MIRTAZAPINE 15 MILLIGRAM(S): 45 TABLET, ORALLY DISINTEGRATING ORAL at 21:50

## 2021-06-02 RX ADMIN — LIDOCAINE 1 PATCH: 4 CREAM TOPICAL at 08:07

## 2021-06-02 RX ADMIN — SENNA PLUS 2 TABLET(S): 8.6 TABLET ORAL at 21:50

## 2021-06-02 RX ADMIN — Medication 1 BOTTLE: at 11:10

## 2021-06-02 RX ADMIN — Medication 500 MILLIGRAM(S): at 08:08

## 2021-06-02 NOTE — BH DISCHARGE NOTE NURSING/SOCIAL WORK/PSYCH REHAB - PATIENT PORTAL LINK FT
You can access the FollowMyHealth Patient Portal offered by [facility] by registering at the following website: [email or web address]. By joining [facility]’s FollowMyHealth portal, you will also be able to view your health information using other applications (apps) compatible with our system.

## 2021-06-02 NOTE — BH DISCHARGE NOTE NURSING/SOCIAL WORK/PSYCH REHAB - NSDCPRRECOMMEND_PSY_ALL_CORE
Psychiatric rehabilitation staff recommends patient will benefit from attending [facility] for medication management, support, and psychotherapy

## 2021-06-02 NOTE — BH DISCHARGE NOTE NURSING/SOCIAL WORK/PSYCH REHAB - NSDCPRGOAL_PSY_ALL_CORE
During the current hospitalization, patient has been addressing psychiatric rehabilitation goals pertaining to identifying coping skills that assist with patient's needs. Patient has demonstrated progress towards psychiatric rehabilitation goals during the current hospitalization. Patient exhibited progress through participating in individual and group therapy and developing additional coping skills to assist with managing negative emotions such as dancing, mindfulness techniques, and doing Yoga. Writer encouraged patient to continue to strengthen and practice effective skills. Patient was receptive. Patient met goal of identifying coping skills as evidenced by reporting these skills have helped her during current hospitalization. Patient reports feeling better. Patient reports socializing with other peers also helped her a lot. Patient reports feeling ready to go home. Patient completed safety plan with unit staff. Patient was compliant with medication during current hospitalization. Patient was visible on the unit and was appropriate with peers and staff. Patient was provided with a Press Ganey survey prior to discharge

## 2021-06-02 NOTE — BH INPATIENT PSYCHIATRY PROGRESS NOTE - MSE UNSTRUCTURED FT
The patient appears stated age, fair hygiene, dressed appropriately.  She was calm, cooperative with the interview.  She maintained appropriate eye contact.  No psychomotor agitation or retardation.  Steady gait.  The patient’s speech was fluent in Luxembourgish, normal in tone, rate, and volume.  The patient’s mood is “better.”  Affect is constricted, but more reactive, stable, appropriate.  The patient’s thoughts are goal directed.  She denies any delusions or hallucinations.  She denies any suicidal or homicidal ideation, intent, or plan.  Insight is fair.  Judgment is fair.  Impulse control has been fair on the unit.

## 2021-06-02 NOTE — BH DISCHARGE NOTE NURSING/SOCIAL WORK/PSYCH REHAB - NSCDUDCCRISIS_PSY_A_CORE
[facility] Well  1 [phone] [facility]-WELL ([phone])  Text "WELL" to [phone]  Website: www.[employer]/.[employer] 1 (905) 601-LPYO (8898) Website: www.safehorizon.org/.National Suicide Prevention Lifeline 8 (370) 557-6628/.  [facility]  1 (063) LIFENET (650-7341)/.  [facility]  [address] [place] [phone]   Hours:  Monday through Friday from 9 AM to 3 PM/.  [employer] - [facility] Crisis Line  2 [phone] [phone], Option 1

## 2021-06-02 NOTE — BH INPATIENT PSYCHIATRY PROGRESS NOTE - NSBHFUPINTERVALHXFT_PSY_A_CORE
Patient seen for follow up for depression, chart reviewed, and case discussed with treatment team.  No events reported overnight.  Spoke with patient using telephone [facility]  #[phone].  The patient continues to report improvements in depression.  She feels more supported and more hopeful for the future.  She denies SI, and behavior has been well controlled.  She has been sleeping well, continues to complain of some sedation during the day, but it is manageable.  She has been eating well.  She has been visible on the unit, attending some groups.  She has been compliant with medications.

## 2021-06-02 NOTE — BH DISCHARGE NOTE NURSING/SOCIAL WORK/PSYCH REHAB - DISCHARGE INSTRUCTIONS AFTERCARE APPOINTMENTS
In order to check the location, date, or time of your aftercare appointment, please refer to your Discharge Instructions Document given to you upon leaving the hospital.  If you have lost the instructions please call [phone]

## 2021-06-02 NOTE — BH DISCHARGE NOTE NURSING/SOCIAL WORK/PSYCH REHAB - NSBHREFERPURPOSE1_PSY_ALL_CORE
Please be informed that your appointment will be held in the office. Please bring your insurance and identification card. There will be translation services held during your visit./Mental Health Treatment

## 2021-06-03 VITALS — TEMPERATURE: 98 F

## 2021-06-03 PROCEDURE — 90853 GROUP PSYCHOTHERAPY: CPT

## 2021-06-03 PROCEDURE — 99232 SBSQ HOSP IP/OBS MODERATE 35: CPT | Mod: 25

## 2021-06-03 RX ORDER — MIRTAZAPINE 45 MG/1
1 TABLET, ORALLY DISINTEGRATING ORAL
Qty: 30 | Refills: 0
Start: 2021-06-03 | End: 2021-07-02

## 2021-06-03 RX ORDER — SENNA PLUS 8.6 MG/1
2 TABLET ORAL
Qty: 60 | Refills: 0
Start: 2021-06-03 | End: 2021-07-02

## 2021-06-03 RX ADMIN — LIDOCAINE 1 PATCH: 4 CREAM TOPICAL at 21:18

## 2021-06-03 RX ADMIN — LIDOCAINE 1 PATCH: 4 CREAM TOPICAL at 09:51

## 2021-06-03 RX ADMIN — SENNA PLUS 2 TABLET(S): 8.6 TABLET ORAL at 20:53

## 2021-06-03 RX ADMIN — Medication 1000 UNIT(S): at 09:49

## 2021-06-03 RX ADMIN — MIRTAZAPINE 15 MILLIGRAM(S): 45 TABLET, ORALLY DISINTEGRATING ORAL at 20:53

## 2021-06-03 RX ADMIN — Medication 500 MILLIGRAM(S): at 09:50

## 2021-06-03 RX ADMIN — LIDOCAINE 1 PATCH: 4 CREAM TOPICAL at 20:15

## 2021-06-03 NOTE — BH PSYCHOLOGY - GROUP THERAPY NOTE - NSPSYCHOLGRPDBTGOAL_PSY_A_CORE
reduce mood and affective lability/improve ability to indentify feelings/improve ability to communicate feelings
reduce mood and affective lability/improve ability to indentify feelings/improve ability to communicate feelings
89
reduce mood and affective lability/improve ability to indentify feelings/improve ability to communicate feelings
improve ability to indentify feelings/improve ability to communicate feelings
reduce mood and affective lability/improve ability to indentify feelings/improve ability to communicate feelings

## 2021-06-03 NOTE — BH INPATIENT PSYCHIATRY PROGRESS NOTE - NSBHFUPINTERVALHXFT_PSY_A_CORE
Patient seen for follow up for depression, chart reviewed, and case discussed with treatment team.  No events reported overnight.  Spoke with patient using telephone [facility]  #[phone].  The patient states she is feeling better.  She denies any SI, and behavior has been well controlled.  The patient spoke again with her  yesterday, which she states went well.  She is hoping to find more purpose, and stay busy when she returns home.  The patient has been sleeping and eating well.  She has been visible on the unit, attending groups.  She has been compliant with medications, tolerating them well.

## 2021-06-03 NOTE — BH PSYCHOLOGY - GROUP THERAPY NOTE - NSPSYCHOLGRPBILLING_PSY_A_CORE
[id number] - Group Psychotherapy
[id number] - Group Psychotherapy
[id number] - Group Psychotherapy
[id number] - Group Psychotherapy
[id number] - Group Psychotherapy

## 2021-06-03 NOTE — BH PSYCHOLOGY - GROUP THERAPY NOTE - NSBHPSYCHOLASSESSPROV_PSY_A_CORE
Licensed Staff Psychologist
Licensed Staff Psychologist and Trainee
Licensed Staff Psychologist

## 2021-06-03 NOTE — BH INPATIENT PSYCHIATRY PROGRESS NOTE - MSE UNSTRUCTURED FT
The patient appears stated age, fair hygiene, dressed appropriately.  She was calm, cooperative with the interview.  She maintained appropriate eye contact.  No psychomotor agitation or retardation.  Steady gait.  The patient’s speech was fluent in Malay, normal in tone, rate, and volume.  The patient’s mood is “better.”  Affect is constricted, but more reactive, stable, appropriate.  The patient’s thoughts are goal directed.  She denies any delusions or hallucinations.  She denies any suicidal or homicidal ideation, intent, or plan.  Insight is fair.  Judgment is fair.  Impulse control has been fair on the unit.

## 2021-06-03 NOTE — BH PSYCHOLOGY - GROUP THERAPY NOTE - NSPSYCHOLGRPDBTPT_PSY_A_CORE FT
DBT Group is a group in which patients learn skills to better manage their emotions and behaviors. Group begins with a mindfulness practice so that patients have an opportunity to practice observing their internal and external experiences.  Following the mindfulness exercise the group learns new skills in a didactic format. Group today focused on the “emotion regulation” module.  Specifically, patients learned about the skill of Problem Solving, which is a method of managing difficult situations when an emotion is justified by the situation.  Patients were taught the seven steps of problem solving and provided with an example of a situation in which the skill would be useful.  Patients were also asked to think about when the skill would be helpful in their lives and how to apply the steps. Patients were also provided with a worksheet in order to help them practice the skill.
DBT Group is a group in which patients learn skills to better manage their emotions and behaviors. Group begins with a mindfulness practice so that patients have an opportunity to practice observing their internal and external experiences.  Following the mindfulness exercise the group learns new skills in a didactic format. Group today focused on the “emotion regulation” module.  Specifically, patients learned the skills of “check the facts,” and “opposite action.” “Check the facts” is about being more realistic about interpretations and assumptions and challenging them appropriately to think more rationally, and “opposite action” involves acting opposite to problematic urges that come with emotions.  provided an example of checking the facts, and patients were encouraged to think about how these skills, and were assigned homework to practice. 
DBT skills generalization group is a group in which patients review the skill taught the day before, and patients have the opportunity to troubleshoot the skill, engage in more practice, and share their experience using the skill. Today’s skills review group focused on the Build Mastery and [place] skills within the Emotion Regulation module. Patients were asked to share activities in which they can engage that provide feelings of accomplishment or mastery. Patients also shared examples of [place] plans they have created to manage potential difficult situations, and feedback from leader as well as peers was provided.  
DBT skills generalization group is a group in which patients review the skill taught the day before, and patients have the opportunity to troubleshoot the skill, engage in more practice, and share their experience using the skill. Today’s skills review group focused on the skills of “check the facts,” and “opposite action.” “Check the facts” is about being more realistic about interpretations and assumptions and challenging them appropriately to think more rationally, and “opposite action” involves acting opposite to problematic urges that come with emotions in order to change negative emotions.  encouraged patients to share examples and leader as well as fellow participants provided helpful feedback and support.  
DBT Group is a group in which patients learn skills to better manage their emotions and behaviors. Group today focused on the “mindfulness” module, which are skills to help patients increase their awareness of their present experience without judgment. Pts were taught the “what” skills (observe, describe, participate) and “how” skills (non-judgmentally, effectively, and one-mindfully) of mindfulness, and engaged in a variety of mindfulness exercises to practice the skills, and shared observations at the end of each activity.

## 2021-06-04 PROCEDURE — 99238 HOSP IP/OBS DSCHRG MGMT 30/<: CPT

## 2021-06-04 RX ADMIN — LIDOCAINE 1 PATCH: 4 CREAM TOPICAL at 08:57

## 2021-06-04 RX ADMIN — Medication 500 MILLIGRAM(S): at 08:57

## 2021-06-04 RX ADMIN — Medication 1000 UNIT(S): at 08:56

## 2021-06-04 NOTE — BH INPATIENT PSYCHIATRY PROGRESS NOTE - NSICDXBHPRIMARYDX_PSY_ALL_CORE
Severe major depressive disorder   F32.2  

## 2021-06-04 NOTE — BH INPATIENT PSYCHIATRY PROGRESS NOTE - NSBHMETABOLIC_PSY_ALL_CORE_FT
BMI: BMI (kg/m2): 21.2 (05-24-21 @ 17:59)  HbA1c:   Glucose: POCT Blood Glucose.: 118 mg/dL (05-23-21 @ 15:24)    BP: 96/66 (05-27-21 @ 06:15) (96/66 - 101/73)  Lipid Panel: 
BMI: BMI (kg/m2): 21.2 (05-24-21 @ 17:59)  HbA1c:   Glucose: POCT Blood Glucose.: 118 mg/dL (05-23-21 @ 15:24)    BP: 106/61 (06-02-21 @ 06:41) (97/70 - 106/61)  Lipid Panel: 
BMI: BMI (kg/m2): 21.2 (05-24-21 @ 17:59)  HbA1c:   Glucose: POCT Blood Glucose.: 118 mg/dL (05-23-21 @ 15:24)    BP: 97/70 (06-01-21 @ 06:29) (94/68 - 98/71)  Lipid Panel: 
BMI: BMI (kg/m2): 21.2 (05-24-21 @ 17:59)  HbA1c:   Glucose: POCT Blood Glucose.: 118 mg/dL (05-23-21 @ 15:24)    BP: --  Lipid Panel: 
BMI: BMI (kg/m2): 21.2 (05-24-21 @ 17:59)  HbA1c:   Glucose: POCT Blood Glucose.: 118 mg/dL (05-23-21 @ 15:24)    BP: 106/74 (05-28-21 @ 07:46) (96/66 - 106/74)  Lipid Panel: 
BMI: BMI (kg/m2): 21.2 (05-24-21 @ 17:59)  HbA1c:   Glucose: POCT Blood Glucose.: 118 mg/dL (05-23-21 @ 15:24)    BP: 101/73 (05-26-21 @ 07:53) (101/73 - 101/73)  Lipid Panel: 
BMI: BMI (kg/m2): 21.2 (05-24-21 @ 17:59)  HbA1c:   Glucose: POCT Blood Glucose.: 118 mg/dL (05-23-21 @ 15:24)    BP: 106/61 (06-02-21 @ 06:41) (106/61 - 106/61)  Lipid Panel: 
BMI: BMI (kg/m2): 21.2 (05-24-21 @ 17:59)  HbA1c:   Glucose: POCT Blood Glucose.: 118 mg/dL (05-23-21 @ 15:24)    BP: 106/61 (06-02-21 @ 06:41) (94/68 - 106/61)  Lipid Panel:

## 2021-06-04 NOTE — BH INPATIENT PSYCHIATRY PROGRESS NOTE - NSTXSUICIDGOAL_PSY_ALL_CORE
Be able to state 3 reasons for living

## 2021-06-04 NOTE — BH INPATIENT PSYCHIATRY PROGRESS NOTE - NSDCCRITERIA_PSY_ALL_CORE
when patient is no longer at acutely elevated risk of harm to self and has symptomatic improvement;  cgi<=2

## 2021-06-04 NOTE — BH INPATIENT PSYCHIATRY PROGRESS NOTE - NSTXSUICIDINTERMD_PSY_ALL_CORE
Remeron trial

## 2021-06-04 NOTE — BH INPATIENT PSYCHIATRY DISCHARGE NOTE - HPI (INCLUDE ILLNESS QUALITY, SEVERITY, DURATION, TIMING, CONTEXT, MODIFYING FACTORS, ASSOCIATED SIGNS AND SYMPTOMS)
24 yo F without formal past psych history, h/o remote NSSIB via superficial cutting, brought in by EMS s/p suicide attempt via Tylenol overdose, admitted medically, now transferred to psychiatry.    Patient seen and examined upon arrival to [address] [place] [address]. Danish speaking,  ID #[id number]. She shares information about marital problems that she does not want shared with her . These problems have been the main stressor behind what she describes as worsening mood and hopelessness over the last few months. States that she met her  in 2017, then in 2018 they got  over the phone, and that this marriage was conducted without first getting approval from 's family. Later was told by  that he would love her if his family accepted the marriage, but that his family ultimately disapproved. While living in [place] for 2 years patient borrowed lots of money from her family to support  (though lied to family as to the reason why). Over this time period, and even more so recently, patient has felt controlled by  feels that she and has no freedom of her own. This has led to worsening mood and hopelessness over the last 2 months (see CL note below for depressive sx). Two weeks prior to SA, patient was upset about the situation and threatened to overdose on Tylenol, but says that her  didn't take her seriously. Then on day of SA, patient found out from sister in law that her  had said he would not be able to live with her anymore. This triggered her to take "a bunch" of tylenol, and then 2-3 min later take "a bunch" more. Lied down and closed her eyes, intended to die. Wanted to drink toilet  to die if Tylenol didn't work. Then patient's sister called, patient told her that she was not going to wake up the next day, patient's sister called ,  went home and then called EMS. Patient was first continued feeling hopeless afterward, but now is expressing hope for a better future after staff told her that social work can help her. Denies current SI on unit, expresses that she wants to get help and wants to be able to work and be more independent. Endorses history of superficial cutting of left wrist 8 years ago. Denies recent/ historical substance use other than occasional social alcohol. Denies AVH. No current meds other than vit C and D (has been taking since getting COVID 1.5 months ago).     As per CL assessment note:   "Pt. took estimated 30 tabs of 500 mg tylenol extra strength tabs about 30 minutes before ER presentation.  Patient states she has been feeling sad and depressed for the past 2 months, has decreased sleep, appetite, concentration, feeling more lonely, and has racing thoughts, excessive worries about the future, her finance situation, and relationship with . Patient is minimizing her stressors, please also see BTCM note.  She states she was at home and feeling sad and took 30 pills of tylenol 500mg extra strength as suicide attempt.  She denied stressor yesterday leading to overdose, said she told a friend who called 911.  However, per , she overdosed and left suicide note/text after 's friend told her that her  will divorce her yesterday.  She denied SI/HI/AVH at this time."

## 2021-06-04 NOTE — BH INPATIENT PSYCHIATRY DISCHARGE NOTE - NSDCMRMEDTOKEN_GEN_ALL_CORE_FT
mirtazapine 15 mg oral tablet: 1 tab(s) orally once a day (at bedtime)  senna oral tablet: 2 tab(s) orally once a day (at bedtime)

## 2021-06-04 NOTE — BH INPATIENT PSYCHIATRY PROGRESS NOTE - NSTXDCOPLKGOAL_PSY_ALL_CORE
Will agree to consider an appropriate level of outpatient care

## 2021-06-04 NOTE — BH INPATIENT PSYCHIATRY PROGRESS NOTE - NSBHFUPINTERVALHXFT_PSY_A_CORE
Patient seen for follow up for depression, chart reviewed, and case discussed with treatment team.  No events reported overnight.  Spoke with patient using telephone [facility]  #[phone].  The patient states she is feeling better.  She denies any SI, and behavior has been well controlled.  The patient spoke again with her  yesterday, which she states went well.  She is hoping to find more purpose, and stay busy when she returns home.  The patient has been sleeping and eating well.  She has been visible on the unit, attending groups.  She has been compliant with medications, tolerating them well. Patient seen for follow up for depression, chart reviewed, and case discussed with treatment team.  No events reported overnight.  Spoke with patient using telephone Greenlandic  #[phone].  The patient continues to states she is feeling better.  She denies depression or SI, and behavior has continued to be well controlled.  The patient is looking forward to discharge.  She admits to some appropriate anxiety.  Support and reassurance provided.  She is hoping to find more purpose, and stay busy when she returns home.  The patient has been sleeping and eating well.  She has been visible on the unit, attending groups.  She has been compliant with medications, tolerating them well.

## 2021-06-04 NOTE — BH INPATIENT PSYCHIATRY PROGRESS NOTE - CURRENT MEDICATION
MEDICATIONS  (STANDING):  ascorbic acid 500 milliGRAM(s) Oral daily  cholecalciferol 1000 Unit(s) Oral daily  ciprofloxacin     Tablet 250 milliGRAM(s) Oral every 12 hours  lidocaine   Patch 1 Patch Transdermal daily  mirtazapine 15 milliGRAM(s) Oral at bedtime    MEDICATIONS  (PRN):  hydrOXYzine hydrochloride 25 milliGRAM(s) Oral every 6 hours PRN anxiety  LORazepam     Tablet 1 milliGRAM(s) Oral every 6 hours PRN severe anxiety  LORazepam   Injectable 2 milliGRAM(s) IntraMuscular once PRN severe agitation  polyethylene glycol 3350 17 Gram(s) Oral daily PRN constipation  sodium chloride 0.65% Nasal 1 Spray(s) Both Nostrils every 8 hours PRN Nasal pain/dryness  
MEDICATIONS  (STANDING):  ascorbic acid 500 milliGRAM(s) Oral daily  cholecalciferol 1000 Unit(s) Oral daily  lidocaine   Patch 1 Patch Transdermal daily  mirtazapine 15 milliGRAM(s) Oral at bedtime  senna 2 Tablet(s) Oral at bedtime    MEDICATIONS  (PRN):  hydrOXYzine hydrochloride 25 milliGRAM(s) Oral every 6 hours PRN anxiety  LORazepam     Tablet 1 milliGRAM(s) Oral every 6 hours PRN severe anxiety  LORazepam   Injectable 2 milliGRAM(s) IntraMuscular once PRN severe agitation  magnesium hydroxide Suspension 30 milliLiter(s) Oral daily PRN Constipation  polyethylene glycol 3350 17 Gram(s) Oral daily PRN constipation  sodium chloride 0.65% Nasal 1 Spray(s) Both Nostrils every 8 hours PRN Nasal pain/dryness  
MEDICATIONS  (STANDING):  ascorbic acid 500 milliGRAM(s) Oral daily  cholecalciferol 1000 Unit(s) Oral daily  lidocaine   Patch 1 Patch Transdermal daily  mirtazapine 15 milliGRAM(s) Oral at bedtime  senna 2 Tablet(s) Oral at bedtime    MEDICATIONS  (PRN):  hydrOXYzine hydrochloride 25 milliGRAM(s) Oral every 6 hours PRN anxiety  LORazepam     Tablet 1 milliGRAM(s) Oral every 6 hours PRN severe anxiety  LORazepam   Injectable 2 milliGRAM(s) IntraMuscular once PRN severe agitation  magnesium hydroxide Suspension 30 milliLiter(s) Oral daily PRN Constipation  polyethylene glycol 3350 17 Gram(s) Oral daily PRN constipation  sodium chloride 0.65% Nasal 1 Spray(s) Both Nostrils every 8 hours PRN Nasal pain/dryness  
MEDICATIONS  (STANDING):  ascorbic acid 500 milliGRAM(s) Oral daily  cholecalciferol 1000 Unit(s) Oral daily  melatonin. 5 milliGRAM(s) Oral at bedtime  mirtazapine 7.5 milliGRAM(s) Oral at bedtime    MEDICATIONS  (PRN):  hydrOXYzine hydrochloride 25 milliGRAM(s) Oral every 6 hours PRN anxiety  LORazepam     Tablet 1 milliGRAM(s) Oral every 6 hours PRN severe anxiety  LORazepam   Injectable 2 milliGRAM(s) IntraMuscular once PRN severe agitation  
MEDICATIONS  (STANDING):  ascorbic acid 500 milliGRAM(s) Oral daily  cholecalciferol 1000 Unit(s) Oral daily  ciprofloxacin     Tablet 250 milliGRAM(s) Oral every 12 hours  lidocaine   Patch 1 Patch Transdermal daily  mirtazapine 7.5 milliGRAM(s) Oral at bedtime    MEDICATIONS  (PRN):  hydrOXYzine hydrochloride 25 milliGRAM(s) Oral every 6 hours PRN anxiety  LORazepam     Tablet 1 milliGRAM(s) Oral every 6 hours PRN severe anxiety  LORazepam   Injectable 2 milliGRAM(s) IntraMuscular once PRN severe agitation  sodium chloride 0.65% Nasal 1 Spray(s) Both Nostrils every 8 hours PRN Nasal pain/dryness  
MEDICATIONS  (STANDING):  ascorbic acid 500 milliGRAM(s) Oral daily  cholecalciferol 1000 Unit(s) Oral daily  lidocaine   Patch 1 Patch Transdermal daily  mirtazapine 15 milliGRAM(s) Oral at bedtime  senna 2 Tablet(s) Oral at bedtime    MEDICATIONS  (PRN):  hydrOXYzine hydrochloride 25 milliGRAM(s) Oral every 6 hours PRN anxiety  LORazepam     Tablet 1 milliGRAM(s) Oral every 6 hours PRN severe anxiety  LORazepam   Injectable 2 milliGRAM(s) IntraMuscular once PRN severe agitation  magnesium hydroxide Suspension 30 milliLiter(s) Oral daily PRN Constipation  polyethylene glycol 3350 17 Gram(s) Oral daily PRN constipation  sodium chloride 0.65% Nasal 1 Spray(s) Both Nostrils every 8 hours PRN Nasal pain/dryness  
MEDICATIONS  (STANDING):  ascorbic acid 500 milliGRAM(s) Oral daily  cholecalciferol 1000 Unit(s) Oral daily  lidocaine   Patch 1 Patch Transdermal daily  mirtazapine 15 milliGRAM(s) Oral at bedtime  senna 2 Tablet(s) Oral at bedtime    MEDICATIONS  (PRN):  hydrOXYzine hydrochloride 25 milliGRAM(s) Oral every 6 hours PRN anxiety  LORazepam     Tablet 1 milliGRAM(s) Oral every 6 hours PRN severe anxiety  LORazepam   Injectable 2 milliGRAM(s) IntraMuscular once PRN severe agitation  magnesium hydroxide Suspension 30 milliLiter(s) Oral daily PRN Constipation  polyethylene glycol 3350 17 Gram(s) Oral daily PRN constipation  sodium chloride 0.65% Nasal 1 Spray(s) Both Nostrils every 8 hours PRN Nasal pain/dryness  
MEDICATIONS  (STANDING):  ascorbic acid 500 milliGRAM(s) Oral daily  cholecalciferol 1000 Unit(s) Oral daily  lidocaine   Patch 1 Patch Transdermal daily  mirtazapine 7.5 milliGRAM(s) Oral at bedtime    MEDICATIONS  (PRN):  hydrOXYzine hydrochloride 25 milliGRAM(s) Oral every 6 hours PRN anxiety  LORazepam     Tablet 1 milliGRAM(s) Oral every 6 hours PRN severe anxiety  LORazepam   Injectable 2 milliGRAM(s) IntraMuscular once PRN severe agitation  sodium chloride 0.65% Nasal 1 Spray(s) Both Nostrils every 8 hours PRN Nasal pain/dryness

## 2021-06-04 NOTE — BH INPATIENT PSYCHIATRY PROGRESS NOTE - NSBHFUPINTERVALCCFT_PSY_A_CORE
Depression, s/p suicide attempt

## 2021-06-04 NOTE — BH INPATIENT PSYCHIATRY DISCHARGE NOTE - NSBHMETABOLIC_PSY_ALL_CORE_FT
BMI: BMI (kg/m2): 21.2 (05-24-21 @ 17:59)  HbA1c:   Glucose: POCT Blood Glucose.: 118 mg/dL (05-23-21 @ 15:24)    BP: 106/61 (06-02-21 @ 06:41) (106/61 - 106/61)  Lipid Panel:

## 2021-06-04 NOTE — BH INPATIENT PSYCHIATRY PROGRESS NOTE - PRN MEDS
MEDICATIONS  (PRN):  hydrOXYzine hydrochloride 25 milliGRAM(s) Oral every 6 hours PRN anxiety  LORazepam     Tablet 1 milliGRAM(s) Oral every 6 hours PRN severe anxiety  LORazepam   Injectable 2 milliGRAM(s) IntraMuscular once PRN severe agitation  
MEDICATIONS  (PRN):  hydrOXYzine hydrochloride 25 milliGRAM(s) Oral every 6 hours PRN anxiety  LORazepam     Tablet 1 milliGRAM(s) Oral every 6 hours PRN severe anxiety  LORazepam   Injectable 2 milliGRAM(s) IntraMuscular once PRN severe agitation  magnesium hydroxide Suspension 30 milliLiter(s) Oral daily PRN Constipation  polyethylene glycol 3350 17 Gram(s) Oral daily PRN constipation  sodium chloride 0.65% Nasal 1 Spray(s) Both Nostrils every 8 hours PRN Nasal pain/dryness  
MEDICATIONS  (PRN):  hydrOXYzine hydrochloride 25 milliGRAM(s) Oral every 6 hours PRN anxiety  LORazepam     Tablet 1 milliGRAM(s) Oral every 6 hours PRN severe anxiety  LORazepam   Injectable 2 milliGRAM(s) IntraMuscular once PRN severe agitation  polyethylene glycol 3350 17 Gram(s) Oral daily PRN constipation  sodium chloride 0.65% Nasal 1 Spray(s) Both Nostrils every 8 hours PRN Nasal pain/dryness  
MEDICATIONS  (PRN):  hydrOXYzine hydrochloride 25 milliGRAM(s) Oral every 6 hours PRN anxiety  LORazepam     Tablet 1 milliGRAM(s) Oral every 6 hours PRN severe anxiety  LORazepam   Injectable 2 milliGRAM(s) IntraMuscular once PRN severe agitation  magnesium hydroxide Suspension 30 milliLiter(s) Oral daily PRN Constipation  polyethylene glycol 3350 17 Gram(s) Oral daily PRN constipation  sodium chloride 0.65% Nasal 1 Spray(s) Both Nostrils every 8 hours PRN Nasal pain/dryness  
MEDICATIONS  (PRN):  hydrOXYzine hydrochloride 25 milliGRAM(s) Oral every 6 hours PRN anxiety  LORazepam     Tablet 1 milliGRAM(s) Oral every 6 hours PRN severe anxiety  LORazepam   Injectable 2 milliGRAM(s) IntraMuscular once PRN severe agitation  sodium chloride 0.65% Nasal 1 Spray(s) Both Nostrils every 8 hours PRN Nasal pain/dryness  
MEDICATIONS  (PRN):  hydrOXYzine hydrochloride 25 milliGRAM(s) Oral every 6 hours PRN anxiety  LORazepam     Tablet 1 milliGRAM(s) Oral every 6 hours PRN severe anxiety  LORazepam   Injectable 2 milliGRAM(s) IntraMuscular once PRN severe agitation  magnesium hydroxide Suspension 30 milliLiter(s) Oral daily PRN Constipation  polyethylene glycol 3350 17 Gram(s) Oral daily PRN constipation  sodium chloride 0.65% Nasal 1 Spray(s) Both Nostrils every 8 hours PRN Nasal pain/dryness  
MEDICATIONS  (PRN):  hydrOXYzine hydrochloride 25 milliGRAM(s) Oral every 6 hours PRN anxiety  LORazepam     Tablet 1 milliGRAM(s) Oral every 6 hours PRN severe anxiety  LORazepam   Injectable 2 milliGRAM(s) IntraMuscular once PRN severe agitation  magnesium hydroxide Suspension 30 milliLiter(s) Oral daily PRN Constipation  polyethylene glycol 3350 17 Gram(s) Oral daily PRN constipation  sodium chloride 0.65% Nasal 1 Spray(s) Both Nostrils every 8 hours PRN Nasal pain/dryness  
MEDICATIONS  (PRN):  hydrOXYzine hydrochloride 25 milliGRAM(s) Oral every 6 hours PRN anxiety  LORazepam     Tablet 1 milliGRAM(s) Oral every 6 hours PRN severe anxiety  LORazepam   Injectable 2 milliGRAM(s) IntraMuscular once PRN severe agitation  sodium chloride 0.65% Nasal 1 Spray(s) Both Nostrils every 8 hours PRN Nasal pain/dryness

## 2021-06-04 NOTE — BH INPATIENT PSYCHIATRY PROGRESS NOTE - NSBHCHARTREVIEWVS_PSY_A_CORE FT
Vital Signs Last 24 Hrs  T(C): 36.6 (28 May 2021 07:46), Max: 37.4 (27 May 2021 20:27)  T(F): 97.8 (28 May 2021 07:46), Max: 99.3 (27 May 2021 20:27)  HR: 83 (28 May 2021 07:46) (83 - 83)  BP: 106/74 (28 May 2021 07:46) (106/74 - 106/74)  BP(mean): --  RR: --  SpO2: --
Vital Signs Last 24 Hrs  T(C): 36.4 (02 Jun 2021 06:41), Max: 37.1 (01 Jun 2021 20:23)  T(F): 97.5 (02 Jun 2021 06:41), Max: 98.7 (01 Jun 2021 20:23)  HR: 81 (02 Jun 2021 06:41) (81 - 81)  BP: 106/61 (02 Jun 2021 06:41) (106/61 - 106/61)  BP(mean): --  RR: --  SpO2: --
Vital Signs Last 24 Hrs  T(C): 36.1 (01 Jun 2021 06:29), Max: 36.4 (31 May 2021 20:52)  T(F): 97 (01 Jun 2021 06:29), Max: 97.6 (31 May 2021 20:52)  HR: 78 (01 Jun 2021 06:29) (78 - 78)  BP: 97/70 (01 Jun 2021 06:29) (97/70 - 97/70)  BP(mean): --  RR: --  SpO2: --
Vital Signs Last 24 Hrs  T(C): 36.4 (25 May 2021 06:20), Max: 36.7 (24 May 2021 16:12)  T(F): 97.5 (25 May 2021 06:20), Max: 98.1 (24 May 2021 16:12)  HR: 74 (24 May 2021 16:12) (74 - 74)  BP: 106/73 (24 May 2021 16:12) (106/73 - 106/73)  BP(mean): --  RR: 18 (24 May 2021 16:12) (18 - 18)  SpO2: 98% (24 May 2021 16:12) (98% - 98%)
Vital Signs Last 24 Hrs  T(C): 37 (27 May 2021 06:15), Max: 37.4 (26 May 2021 20:31)  T(F): 98.6 (27 May 2021 06:15), Max: 99.3 (26 May 2021 20:31)  HR: 81 (27 May 2021 06:15) (81 - 81)  BP: 96/66 (27 May 2021 06:15) (96/66 - 96/66)  BP(mean): --  RR: --  SpO2: --
Vital Signs Last 24 Hrs  T(C): 36.3 (03 Jun 2021 06:22), Max: 36.5 (02 Jun 2021 21:16)  T(F): 97.4 (03 Jun 2021 06:22), Max: 97.7 (02 Jun 2021 21:16)  HR: --  BP: --  BP(mean): --  RR: 17 (03 Jun 2021 06:22) (17 - 17)  SpO2: --
Vital Signs Last 24 Hrs  T(C): 36.9 (03 Jun 2021 19:52), Max: 36.9 (03 Jun 2021 19:52)  T(F): 98.4 (03 Jun 2021 19:52), Max: 98.4 (03 Jun 2021 19:52)  HR: --  BP: --  BP(mean): --  RR: --  SpO2: --
Vital Signs Last 24 Hrs  T(C): 36.6 (26 May 2021 07:53), Max: 36.6 (26 May 2021 07:53)  T(F): 97.8 (26 May 2021 07:53), Max: 97.8 (26 May 2021 07:53)  HR: 101 (26 May 2021 07:53) (101 - 101)  BP: 101/73 (26 May 2021 07:53) (101/73 - 101/73)  BP(mean): --  RR: --  SpO2: --

## 2021-06-04 NOTE — BH INPATIENT PSYCHIATRY DISCHARGE NOTE - HOSPITAL COURSE
The patient was admitted to the unit, where she admitted to depression, hopelessness, and helplessness with suicidal ideation.  She had no SI on the unit, and behavior was always well controlled.  The patient was agreeable to start Remeron to target depression, insomnia, and loss of appetite.  Remeron was increased to 15mg hs which was tolerated well.    The patient showed good improvement in symptoms.  Her mood started to improve, and she denied any SI.  She was brighter and expressed being more hopeful.  She spoke with her  on a number of occasions and he also visited her, which went well.  She will return to where she was living, but was hopeful to be more active, and perhaps look for employment.  The patient was very interested in case management and outpatient follow-up for continued support.  She was visible on the unit, social with peers, and attended groups.  She was sleeping and eating well.    When interviewing the patient, telephone Amharic interpreters were used, but the patient showed good understanding of English and was able to speak some English as well.    On the day of discharge, the patient has continued to show improvement in symptoms.  She states her depression is much improved, and she denies any anxiety.  She denies any SI, and her behavior has been well controlled.  The patient has been sleeping well.  The patient has been fully engaged in treatment on the unit, compliant with medications, and is looking forward to continuing care as an outpatient.  The patient has support from her , who is also a protective factor for her.  She is more hopeful and future-oriented.  She was able to safety plan appropriately.  The patient’s risk cannot be further decreased with continued inpatient hospitalization.  She is no longer an acute danger to herself or others, and is stable for discharge home.    Discharge medications: Remeron 15mg hs, Senna 2tabs hs

## 2021-06-04 NOTE — BH INPATIENT PSYCHIATRY PROGRESS NOTE - MSE UNSTRUCTURED FT
The patient appears stated age, fair hygiene, dressed appropriately.  She was calm, cooperative with the interview.  She maintained appropriate eye contact.  No psychomotor agitation or retardation.  Steady gait.  The patient’s speech was fluent in Faroese, normal in tone, rate, and volume.  The patient’s mood is “better.”  Affect is constricted, but more reactive, stable, appropriate.  The patient’s thoughts are goal directed.  She denies any delusions or hallucinations.  She denies any suicidal or homicidal ideation, intent, or plan.  Insight is fair.  Judgment is fair.  Impulse control has been fair on the unit. The patient appears stated age, fair hygiene, dressed appropriately.  She was calm, cooperative with the interview.  She maintained appropriate eye contact.  No psychomotor agitation or retardation.  Steady gait.  The patient’s speech was fluent in Georgian, normal in tone, rate, and volume.  The patient’s mood is “okay.”  Affect is constricted, but more reactive, stable, appropriate.  The patient’s thoughts are goal directed.  She denies any delusions or hallucinations.  She denies any suicidal or homicidal ideation, intent, or plan.  Insight is fair.  Judgment is fair.  Impulse control has been fair on the unit.

## 2021-06-04 NOTE — BH INPATIENT PSYCHIATRY PROGRESS NOTE - NSBHASSESSSUMMFT_PSY_ALL_CORE
The patient has continued to show improvement in symptoms.  She states her depression is much improved, and she denies any anxiety.  She denies any SI, and her behavior has been well controlled.  The patient has been sleeping well.  The patient has been fully engaged in treatment on the unit, compliant with medications, and is looking forward to continuing care as an outpatient.  The patient has support from her , who is also a protective factor for her.  She is more hopeful and future-oriented.  She was able to safety plan appropriately.  The patient’s risk cannot be further decreased with continued inpatient hospitalization.  She is no longer an acute danger to herself or others, and is stable for discharge home.  -Continue Remeron 15mg hs for depression, insomnia, and poor appetite  -Discharge home
The patient continues to be depressed, hopeless, but denies active SI in the hospital.  She has been compliant with medications, complains of some sedation.  -Continue Remeron 7.5mg hs for depression, insomnia, and poor appetite
The patient continues to be depressed, but is showing some improvement, denies SI, behavior well controlled.  She has been compliant with medications, experiencing some sedation.  -Continue Remeron 15mg hs for depression, insomnia, and poor appetite
The patient presents with depression, hopelessness, s/p overdose on Tylenol after learning about potential divorce from her .  She remains depressed, dysphoric, hopeless, but denies SI in the hospital, happy to be receiving help.  The patient is agreeable to starting medications.  -Start Remeron 7.5mg hs for depression, insomnia, and poor appetite
The patient continues to be depressed, but is showing good improvement, denies SI, behavior well controlled.  She has been compliant with medications, experiencing some sedation.  -Continue Remeron 15mg hs for depression, insomnia, and poor appetite
The patient continues to be depressed, hopeless, but denies active SI in the hospital.  She has been compliant with medications, complains of some sedation.  -Continue Remeron 7.5mg hs for depression, insomnia, and poor appetite
The patient continues to be depressed, but is showing some improvement, denies SI, behavior well controlled.  She has been compliant with medications, experiencing some sedation.  -Continue Remeron 15mg hs for depression, insomnia, and poor appetite
The patient continues to be depressed, denies active SI in the hospital.  She is starting to show some improvement.  She has been compliant with medications, tolerating it well.  -Increase Remeron to 15mg hs for depression, insomnia, and poor appetite

## 2021-06-04 NOTE — BH INPATIENT PSYCHIATRY PROGRESS NOTE - NSBHCONTPROVIDER_PSY_ALL_CORE
Not applicable

## 2021-06-04 NOTE — BH INPATIENT PSYCHIATRY PROGRESS NOTE - NSTXDEPRESGOAL_PSY_ALL_CORE
Will identify 2 coping skills that assist in improving mood

## 2021-06-29 NOTE — SOCIAL WORK POST DISCHARGE FOLLOW UP NOTE - NSBHSWFOLLOWUP_PSY_ALL_CORE_FT
SW has not heard back from the pt as she has missed appt. SW has informed treatment team who have confirmed pt was stable at the time of d/c., does not require a mobile crisis call. SW is closing case at this time.

## 2021-08-15 NOTE — BH INPATIENT PSYCHIATRY PROGRESS NOTE - NSTXPROBDCOPLK_PSY_ALL_CORE
DISCHARGE ISSUE - LACK OF APPROPRIATE OUTPATIENT SERVICES
DISCHARGE ISSUE - LACK OF APPROPRIATE OUTPATIENT SERVICES
0
DISCHARGE ISSUE - LACK OF APPROPRIATE OUTPATIENT SERVICES

## 2021-10-07 NOTE — BH INPATIENT PSYCHIATRY PROGRESS NOTE - MSE OPTIONS
Unstructured MSE
If you are a smoker, it is important for your health to stop smoking. Please be aware that second hand smoke is also harmful.
Unstructured MSE

## 2022-02-09 NOTE — BH INPATIENT PSYCHIATRY ASSESSMENT NOTE - NSBHMSERELATED_PSY_A_CORE
OFFICE VISIT          Patient: Lashay Giordano Date of Service: 2022   : 1944 MRN: [id number]     Chief Complaint   Patient presents with   • Office Visit     Pt here for check up states when she trie to  something with her hands they hurt, X 1 1/2 month.         SUBJECTIVE:   HISTORY OF PRESENT ILLNESS:  Lashay Giordano is a 77 year old female who  has HTN,HLD,  S/p CVA, atherosclerotic heart disease and CKD presents today for right thumb pain for 1 month and the last few days she has similar pain in her left t thumb. The pain is at the base of her thumbs at the wrists.  She has taken tylenol for the discomfort. She hood not work- just ADL's      PAST MEDICAL HISTORY:  Past Medical History:   Diagnosis Date   • Coronary artery disease    • Hyperlipoproteinemia        MEDICATIONS:  Current Outpatient Medications   Medication Sig   • Multiple Vitamins-Minerals (PRESERVISION AREDS 2 PO) Take by mouth 2 times daily before breakfast and at night.   • alendronate (FOSAMAX) 70 MG tablet Take 1 tablet by mouth every 7 days.   • warfarin (COUMADIN) 2.5 MG tablet 3 TABS TUES, THURS, SAT AND 2 THE REST OF THE WEEK   • atorvastatin (LIPITOR) 40 MG tablet Take 1 tablet by mouth nightly.   • amLODIPine (NORVASC) 5 MG tablet Take 1 tablet by mouth daily.   • acetaminophen (TYLENOL) 500 MG tablet Take 500 mg by mouth every 6 hours as needed for Pain.   • metoPROLOL succinate (TOPROL-XL) 50 MG 24 hr tablet TAKE 1 TABLET BY MOUTH DAILY. HOLD FOR BLOOD PRESSURE LESS THAN 120.     No current facility-administered medications for this visit.       ALLERGIES:  ALLERGIES:  No Known Allergies    PAST SURGICAL HISTORY:  Past Surgical History:   Procedure Laterality Date   • Cardiac catherization         FAMILY HISTORY:  History reviewed. No pertinent family history.    SOCIAL HISTORY:  Social History     Tobacco Use   • Smoking status: Never Smoker   • Smokeless tobacco: Never Used   Substance Use Topics   • Alcohol use: Yes      Alcohol/week: 3.0 standard drinks     Types: 3 Glasses of wine per week   • Drug use: No       Review of Systems   Constitutional: Negative.    HENT: Negative.    Eyes: Negative.    Respiratory: Negative.    Cardiovascular: Negative.    Gastrointestinal: Negative.    Genitourinary: Negative.    Musculoskeletal:        Base of thumb/wrist pain   Skin: Negative.    Neurological: Negative.    Endo/Heme/Allergies: Negative.    Psychiatric/Behavioral: Negative.    All other systems reviewed and are negative.      OBJECTIVE:     Physical Exam  Vitals and nursing note reviewed.   HENT:      Head: Normocephalic and atraumatic.      Right Ear: External ear normal.      Left Ear: External ear normal.      Nose: Nose normal.   Eyes:      Conjunctiva/sclera: Conjunctivae normal.   Cardiovascular:      Rate and Rhythm: Normal rate and regular rhythm.      Heart sounds: Normal heart sounds.   Pulmonary:      Effort: Pulmonary effort is normal.      Breath sounds: Normal breath sounds.   Musculoskeletal:         General: Tenderness (base of thumb/wrist pain . some discomfort when making a fist) present. Normal range of motion.      Cervical back: Normal range of motion.   Skin:     General: Skin is warm and dry.   Neurological:      Mental Status: She is alert and oriented to person, place, and time.      Gait: Gait is intact.      Deep Tendon Reflexes: Reflexes are normal and symmetric.   Psychiatric:         Mood and Affect: Mood and affect normal.         Cognition and Memory: Memory normal.         Judgment: Judgment normal.         Visit Vitals  /71   Pulse 70   Temp 95.1 °F (35.1 °C) (Tympanic)   Resp 18   Ht 5' 7\" (1.702 m)   Wt 87.4 kg (192 lb 9.2 oz)   LMP  (LMP Unknown)   SpO2 100%   BMI 30.16 kg/m²       DIAGNOSTIC STUDIES:   LAB RESULTS:    No visits with results within 1 Month(s) from this visit.   Latest known visit with results is:   Anti-Coag on 01/07/2022   Component Date Value Ref Range Status   • INR  01/07/2022 2.7   Final       Assessment AND PLAN:   Lashay was seen today for office visit.    Diagnoses and all orders for this visit:    De Querjusto's tenosynovitis    Atherosclerotic heart disease of native coronary artery with other forms of angina pectoris (CMS/HCC)  -     CBC WITH DIFFERENTIAL; Future  -     COMPREHENSIVE METABOLIC PANEL; Future  -     LIPID PANEL WITH REFLEX; Future    Stage 3 chronic kidney disease, unspecified whether stage 3a or 3b CKD (CMS/HCC)  -     COMPREHENSIVE METABOLIC PANEL; Future    Screening for thyroid disorder  -     COMPREHENSIVE METABOLIC PANEL; Future  -     THYROID STIMULATING HORMONE REFLEX; Future    Obesity (BMI 30.0-34.9)    Benign essential HTN    Hyperlipidemia, unspecified hyperlipidemia type      Given info sheet on tenosynovitis with exercises   tylenol for pain   fasting labs ordered  Continue all meds   exercise encouraged   she agreed     Good

## 2022-10-28 NOTE — BH PSYCHOLOGY - GROUP THERAPY NOTE - NSPSYCHOLGRPDBTPT_PSY_A_CORE
I had sent patient result note but I also tried to call about the ultrasound result. Patient not available.    Harsha German MD    
stable mood and affect in group/no self-destructive impulses/behaviors/other...
stable mood and affect in group/other...
stable mood and affect in group/patient showing good behavior control/other...
stable mood and affect in group/no self-destructive impulses/behaviors/other...
stable mood and affect in group/no self-destructive impulses/behaviors/other...

## 2023-02-03 NOTE — ED ADULT NURSE NOTE - NS ED NURSE RECORD ANOTHER HT AND WT
[FreeTextEntry1] : 41 y/o  LMP 23 presents for routine annual GYN exam. Last seen for annual on 2022, negative PAP and HPV. Screen mammogram and US on 23 rated BIRADS-0, with repeat diagnostic mammo/US rated BIRADS-4B for 3 suspicious groupings of calcification in the lower outer right breast. 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 \par \par She had a surgical consult with Dr. Reyes on 23, and is planning a lumpectomy with f/u radiation and tamoxifen..\par \par ObHx: NSVDx1, CDx1 (2018, [facility])\par GYNHx: DCIS, LCIS\par PSHx: stereotactic core needle biopsy of the right breast\par NKDA  [Mammogramdate] : 01/2023 [TextBox_19] : BIRADS-4B [PapSmeardate] : 1/2022 [TextBox_31] : wnl Yes

## 2024-05-26 ENCOUNTER — EMERGENCY (EMERGENCY)
Facility: HOSPITAL | Age: 29
LOS: 1 days | Discharge: HOME | End: 2024-05-26
Admitting: STUDENT IN AN ORGANIZED HEALTH CARE EDUCATION/TRAINING PROGRAM
Payer: MEDICAID

## 2024-05-26 VITALS
RESPIRATION RATE: 18 BRPM | SYSTOLIC BLOOD PRESSURE: 121 MMHG | TEMPERATURE: 99 F | OXYGEN SATURATION: 100 % | DIASTOLIC BLOOD PRESSURE: 80 MMHG | HEART RATE: 83 BPM

## 2024-05-26 PROCEDURE — 99284 EMERGENCY DEPT VISIT MOD MDM: CPT

## 2024-05-26 PROCEDURE — 93010 ELECTROCARDIOGRAM REPORT: CPT

## 2024-05-27 VITALS
SYSTOLIC BLOOD PRESSURE: 128 MMHG | HEART RATE: 75 BPM | OXYGEN SATURATION: 100 % | TEMPERATURE: 98 F | RESPIRATION RATE: 16 BRPM | DIASTOLIC BLOOD PRESSURE: 74 MMHG

## 2024-05-27 PROBLEM — U07.1 COVID-19: Chronic | Status: ACTIVE | Noted: 2021-05-24

## 2024-05-27 LAB
ALBUMIN SERPL ELPH-MCNC: 4.8 G/DL — SIGNIFICANT CHANGE UP (ref 3.3–5)
ALP SERPL-CCNC: 97 U/L — SIGNIFICANT CHANGE UP (ref 40–120)
ALT FLD-CCNC: 15 U/L — SIGNIFICANT CHANGE UP (ref 4–33)
ANION GAP SERPL CALC-SCNC: 12 MMOL/L — SIGNIFICANT CHANGE UP (ref 7–14)
AST SERPL-CCNC: 21 U/L — SIGNIFICANT CHANGE UP (ref 4–32)
BASOPHILS # BLD AUTO: 0.09 K/UL — SIGNIFICANT CHANGE UP (ref 0–0.2)
BASOPHILS NFR BLD AUTO: 1 % — SIGNIFICANT CHANGE UP (ref 0–2)
BILIRUB SERPL-MCNC: 0.2 MG/DL — SIGNIFICANT CHANGE UP (ref 0.2–1.2)
BUN SERPL-MCNC: 9 MG/DL — SIGNIFICANT CHANGE UP (ref 7–23)
CALCIUM SERPL-MCNC: 9.8 MG/DL — SIGNIFICANT CHANGE UP (ref 8.4–10.5)
CHLORIDE SERPL-SCNC: 103 MMOL/L — SIGNIFICANT CHANGE UP (ref 98–107)
CO2 SERPL-SCNC: 23 MMOL/L — SIGNIFICANT CHANGE UP (ref 22–31)
CREAT SERPL-MCNC: 0.66 MG/DL — SIGNIFICANT CHANGE UP (ref 0.5–1.3)
EGFR: 122 ML/MIN/1.73M2 — SIGNIFICANT CHANGE UP
EOSINOPHIL # BLD AUTO: 0.63 K/UL — HIGH (ref 0–0.5)
EOSINOPHIL NFR BLD AUTO: 7.1 % — HIGH (ref 0–6)
GLUCOSE SERPL-MCNC: 94 MG/DL — SIGNIFICANT CHANGE UP (ref 70–99)
HCT VFR BLD CALC: 39.3 % — SIGNIFICANT CHANGE UP (ref 34.5–45)
HGB BLD-MCNC: 13.4 G/DL — SIGNIFICANT CHANGE UP (ref 11.5–15.5)
IANC: 3.91 K/UL — SIGNIFICANT CHANGE UP (ref 1.8–7.4)
IMM GRANULOCYTES NFR BLD AUTO: 0.3 % — SIGNIFICANT CHANGE UP (ref 0–0.9)
LYMPHOCYTES # BLD AUTO: 3.51 K/UL — HIGH (ref 1–3.3)
LYMPHOCYTES # BLD AUTO: 39.7 % — SIGNIFICANT CHANGE UP (ref 13–44)
MCHC RBC-ENTMCNC: 25.7 PG — LOW (ref 27–34)
MCHC RBC-ENTMCNC: 34.1 GM/DL — SIGNIFICANT CHANGE UP (ref 32–36)
MCV RBC AUTO: 75.4 FL — LOW (ref 80–100)
MONOCYTES # BLD AUTO: 0.67 K/UL — SIGNIFICANT CHANGE UP (ref 0–0.9)
MONOCYTES NFR BLD AUTO: 7.6 % — SIGNIFICANT CHANGE UP (ref 2–14)
NEUTROPHILS # BLD AUTO: 3.91 K/UL — SIGNIFICANT CHANGE UP (ref 1.8–7.4)
NEUTROPHILS NFR BLD AUTO: 44.3 % — SIGNIFICANT CHANGE UP (ref 43–77)
NRBC # BLD: 0 /100 WBCS — SIGNIFICANT CHANGE UP (ref 0–0)
NRBC # FLD: 0 K/UL — SIGNIFICANT CHANGE UP (ref 0–0)
PLATELET # BLD AUTO: 276 K/UL — SIGNIFICANT CHANGE UP (ref 150–400)
POTASSIUM SERPL-MCNC: 4 MMOL/L — SIGNIFICANT CHANGE UP (ref 3.5–5.3)
POTASSIUM SERPL-SCNC: 4 MMOL/L — SIGNIFICANT CHANGE UP (ref 3.5–5.3)
PROT SERPL-MCNC: 7.8 G/DL — SIGNIFICANT CHANGE UP (ref 6–8.3)
RBC # BLD: 5.21 M/UL — HIGH (ref 3.8–5.2)
RBC # FLD: 13.4 % — SIGNIFICANT CHANGE UP (ref 10.3–14.5)
SODIUM SERPL-SCNC: 138 MMOL/L — SIGNIFICANT CHANGE UP (ref 135–145)
WBC # BLD: 8.84 K/UL — SIGNIFICANT CHANGE UP (ref 3.8–10.5)
WBC # FLD AUTO: 8.84 K/UL — SIGNIFICANT CHANGE UP (ref 3.8–10.5)

## 2024-05-27 PROCEDURE — 71046 X-RAY EXAM CHEST 2 VIEWS: CPT | Mod: 26

## 2024-05-27 RX ORDER — KETOROLAC TROMETHAMINE 30 MG/ML
15 SYRINGE (ML) INJECTION ONCE
Refills: 0 | Status: DISCONTINUED | OUTPATIENT
Start: 2024-05-27 | End: 2024-05-27

## 2024-05-27 RX ADMIN — Medication 15 MILLIGRAM(S): at 00:30

## 2024-05-27 NOTE — ED PROVIDER NOTE - NSFOLLOWUPINSTRUCTIONS_ED_ALL_ED_FT
Breast Tenderness  Breast tenderness is a common problem for women of all ages, but may also occur in men. Breast tenderness has many possible causes, including hormone changes, infections, taking certain medicines, and caffeine intake. In women, the pain usually comes and goes with the menstrual cycle, but it can also be constant. Breast tenderness may range from mild discomfort to severe pain.    You may have tests, such as a mammogram or an ultrasound, to check for any unusual findings. Having breast tenderness usually does not mean that you have breast cancer.    Follow these instructions at home:  Managing pain and discomfort    Bag of ice on a towel on the skin.   If directed, put ice on the painful area. To do this:  Put ice in a plastic bag.  Place a towel between your skin and the bag.  Leave the ice on for 20 minutes, 2–3 times a day.  If your skin turns bright red, remove the ice right away to prevent skin damage. The risk of skin damage is higher if you cannot feel pain, heat, or cold.  Wear a supportive bra or chest support:  During exercise.  While sleeping, if your breasts are very tender.  Medicines    Take over-the-counter and prescription medicines only as told by your health care provider. If the cause of your pain is an infection, you may be prescribed an antibiotic medicine.  If you were prescribed antibiotics, take them as told by your health care provider. Do not stop using the antibiotic even if you start to feel better.  Eating and drinking    Decrease the amount of caffeine in your diet. Instead, drink more water and choose caffeine-free drinks.  Your health care provider may recommend that you lessen the amount of fat in your diet. You can do this by:  Limiting fried foods.  Cooking foods using methods such as baking, boiling, grilling, and broiling.  General instructions    A person writing in a journal.  Keep a log of the days and times when your breasts are most tender.  Ask your health care provider how to do breast exams at home. This will help you notice if you have an unusual growth or lump.  Keep all follow-up visits.  Contact a health care provider if:  Any part of your breast is hard, red, and hot to the touch. This may be a sign of infection.  You are a woman and have a new or painful lump in your breast that remains after your menstrual period ends.  You are not breastfeeding and you have fluid, especially blood or pus, coming out of your nipples.  You have a fever.  Your pain does not improve or it gets worse.  Your pain is interfering with your daily activities.  Summary  Breast tenderness may range from mild discomfort to severe pain.  Breast tenderness has many possible causes, including hormone changes, infections, taking certain medicines, and caffeine intake.  It can be treated with ice, wearing a supportive bra or chest support, and medicines.  Make changes to your diet as told by your health care provider.  This information is not intended to replace advice given to you by your health care provider. Make sure you discuss any questions you have with your health care provider.

## 2024-05-27 NOTE — ED ADULT NURSE NOTE - NSFALLUNIVINTERV_ED_ALL_ED
Bed/Stretcher in lowest position, wheels locked, appropriate side rails in place/Call bell, personal items and telephone in reach/Instruct patient to call for assistance before getting out of bed/chair/stretcher/Non-slip footwear applied when patient is off stretcher/[place] to call system/Physically safe environment - no spills, clutter or unnecessary equipment/Purposeful proactive rounding/Room/bathroom lighting operational, light cord in reach

## 2024-05-27 NOTE — ED PROVIDER NOTE - PATIENT PORTAL LINK FT
You can access the FollowMyHealth Patient Portal offered by [facility] by registering at the following website: [email or web address]. By joining [facility]’s FollowMyHealth portal, you will also be able to view your health information using other applications (apps) compatible with our system.

## 2024-05-27 NOTE — ED ADULT NURSE NOTE - OBJECTIVE STATEMENT
28 year old female brought to room 7. Patient alert and oriented times four. Patient ambulatory at baseline. c/o atraumatic right breast pain x 2 weeks.  Patient states she does do heavy lifting and has been doing a new workout regimen this month.   report given from nurse , patient laying in semi fowlers position on the stretcher. patient alert and oriented times four. patient denies shortness of breath, chest pain, nausea, vomiting, chill, fever.  Respirations equal and adequate. Patients 20 gauge left a.c IV patent, no signs of infiltration. Safety measures in place, call bell within reach. Patient stable upon leaving the room.

## 2024-05-27 NOTE — ED PROVIDER NOTE - PHYSICAL EXAMINATION
Vital signs reviewed.   CONSTITUTIONAL: Well-appearing; well-nourished; in no apparent distress. Non-toxic appearing.   HEAD: Normocephalic, atraumatic.  EYES: PERRL, EOM intact, conjunctiva and sclera WNL.  CARD: Normal S1, S2; no murmurs, rubs, or gallops noted.  RESP: Normal chest excursion with respiration; breath sounds clear and equal bilaterally; no wheezes, rhonchi, or rales.  ABD/GI: soft, non-distended; non-tender; no palpable organomegaly, no pulsatile mass.  EXT/MS: moves all extremities; distal pulses are normal, no pedal edema.  SKIN: Normal for age and race; warm; dry; good turgor; no apparent lesions or exudate noted.  Right breast exam: dense, bumpy firmness felt to 6oclock areola and 10oclock breast. without erythema, warmth, fluctuance, discharge, nipple discharge.   NEURO: Awake, alert, oriented x 3, no gross deficits, CN II-XII grossly intact, no motor or sensory deficit noted.  PSYCH: Normal mood; appropriate affect.

## 2024-05-27 NOTE — ED ADULT NURSE NOTE - CHIEF COMPLAINT QUOTE
c/o atraumatic right breast pain x 2 weeks radiating into the back and armpit, unrelieved with OTC. Denies pmhx

## 2024-05-27 NOTE — ED PROVIDER NOTE - CLINICAL SUMMARY MEDICAL DECISION MAKING FREE TEXT BOX
28-year-old female with no significant past medical history presents to the ER with complaint of nonpleuritic, nonexertional, worse with palpation right breast pain with radiation of pain into her right armpit and right upper shoulder pain is located underneath her nipple pain is normally associated with menses however is lasting longer approximately 3 weeks this month.  Has taken Advil and Tylenol with only minimal relief.  Denies nipple discharge, skin changes, fever, nausea, vomiting, diarrhea, urinary symptoms, vaginal bleeding.  Patient states last menstrual period was April 25, missed her menses.  Was due 2 days ago  eval for pregnancy  breast pain likely fibrocystic breast, no clinical concern or exam findings consistent with infection/ abscess.   ekg NSR non ischemic.   PERC neg   if labs xray wnl, provide gyn/ breast imaging follow up for complete breast imaging.

## 2024-05-27 NOTE — ED PROVIDER NOTE - OBJECTIVE STATEMENT
28-year-old female with no significant past medical history presents to the ER with complaint of nonpleuritic, nonexertional, worse with palpation right breast pain with radiation of pain into her right armpit and right upper shoulder pain is located underneath her nipple pain is normally associated with menses however is lasting longer approximately 3 weeks this month.  Has taken Advil and Tylenol with only minimal relief.  Denies nipple discharge, skin changes, fever, nausea, vomiting, diarrhea, urinary symptoms, vaginal bleeding.  Patient states last menstrual period was April 25, missed her menses.  Was due 2 days ago

## 2025-01-13 NOTE — BH SAFETY PLAN - DISTRACTION PHONE 1
Order reviewed and signed  
Prolia expires 1/5/25. New script  sent to [facility] nurse [place] for review and signature. Writer printed label for book.     
[phone]

## 2025-03-26 NOTE — BH PATIENT PROFILE - NSVRISKABUSE_PSY_ALL_CORE
[facility]itional [facility] Coordination    Referral from: Dr. dOom    Insurance Provider on Facesheet: [facility]    Potential Rehab Diagnosis: TBD    Chart review indicates patient may have on going medical management and may have therapy needs to possibly meet inpatient rehab facility criteria with the goal of returning to community.    D/C support will need to be verified: S.O.    Physiatry consultation pended per protocol.  W/U & TX pending.     Thank you for the referral.       No